# Patient Record
Sex: MALE | Race: BLACK OR AFRICAN AMERICAN | NOT HISPANIC OR LATINO
[De-identification: names, ages, dates, MRNs, and addresses within clinical notes are randomized per-mention and may not be internally consistent; named-entity substitution may affect disease eponyms.]

---

## 2018-10-15 ENCOUNTER — APPOINTMENT (OUTPATIENT)
Dept: NEUROLOGY | Facility: CLINIC | Age: 58
End: 2018-10-15

## 2018-10-29 ENCOUNTER — RECORD ABSTRACTING (OUTPATIENT)
Age: 58
End: 2018-10-29

## 2018-10-29 DIAGNOSIS — E66.3 OVERWEIGHT: ICD-10-CM

## 2018-10-29 DIAGNOSIS — Z87.39 PERSONAL HISTORY OF OTHER DISEASES OF THE MUSCULOSKELETAL SYSTEM AND CONNECTIVE TISSUE: ICD-10-CM

## 2018-10-29 DIAGNOSIS — Z86.2 PERSONAL HISTORY OF DISEASES OF THE BLOOD AND BLOOD-FORMING ORGANS AND CERTAIN DISORDERS INVOLVING THE IMMUNE MECHANISM: ICD-10-CM

## 2018-10-29 DIAGNOSIS — Z86.69 PERSONAL HISTORY OF OTHER DISEASES OF THE NERVOUS SYSTEM AND SENSE ORGANS: ICD-10-CM

## 2018-10-29 RX ORDER — ZIPRASIDONE HYDROCHLORIDE 80 MG/1
80 CAPSULE ORAL
Refills: 0 | Status: ACTIVE | COMMUNITY

## 2018-10-29 RX ORDER — DIVALPROEX SODIUM 500 1/1
500 TABLET, EXTENDED RELEASE ORAL
Refills: 0 | Status: ACTIVE | COMMUNITY

## 2018-12-03 ENCOUNTER — APPOINTMENT (OUTPATIENT)
Dept: NEUROLOGY | Facility: CLINIC | Age: 58
End: 2018-12-03

## 2018-12-27 ENCOUNTER — RX RENEWAL (OUTPATIENT)
Age: 58
End: 2018-12-27

## 2018-12-31 ENCOUNTER — APPOINTMENT (OUTPATIENT)
Dept: FAMILY MEDICINE | Facility: CLINIC | Age: 58
End: 2018-12-31
Payer: COMMERCIAL

## 2018-12-31 VITALS
HEART RATE: 63 BPM | OXYGEN SATURATION: 100 % | TEMPERATURE: 98.8 F | HEIGHT: 68 IN | SYSTOLIC BLOOD PRESSURE: 132 MMHG | DIASTOLIC BLOOD PRESSURE: 90 MMHG | BODY MASS INDEX: 28.95 KG/M2 | WEIGHT: 191 LBS

## 2018-12-31 PROCEDURE — 99214 OFFICE O/P EST MOD 30 MIN: CPT | Mod: 25

## 2018-12-31 PROCEDURE — 36415 COLL VENOUS BLD VENIPUNCTURE: CPT

## 2018-12-31 RX ORDER — ATOVAQUONE AND PROGUANIL HYDROCHLORIDE 250; 100 MG/1; MG/1
250-100 TABLET, FILM COATED ORAL
Refills: 0 | Status: DISCONTINUED | COMMUNITY
End: 2018-12-31

## 2018-12-31 NOTE — REVIEW OF SYSTEMS
[Postnasal Drip] : postnasal drip [Nasal Discharge] : nasal discharge [Cough] : cough [Negative] : Musculoskeletal [Shortness Of Breath] : no shortness of breath [Wheezing] : no wheezing

## 2018-12-31 NOTE — PHYSICAL EXAM
[No Acute Distress] : no acute distress [Well Nourished] : well nourished [Well Developed] : well developed [Well-Appearing] : well-appearing [Normal Outer Ear/Nose] : the outer ears and nose were normal in appearance [Normal Oropharynx] : the oropharynx was normal [No Respiratory Distress] : no respiratory distress  [Normal Rate] : normal rate  [Regular Rhythm] : with a regular rhythm [Normal S1, S2] : normal S1 and S2 [No Edema] : there was no peripheral edema [de-identified] : Some faint expiratory wheezes in bilateral lung bases

## 2018-12-31 NOTE — HISTORY OF PRESENT ILLNESS
[FreeTextEntry8] : Pt here for f/u.\par Was in [place] for three weeks.  Home now and has no infections.\par c/o respiratory infection.  Started a month ago with nasal congestion.  Then the cough started a week ago.  Mostly a dry cough.  Breathing is OK.  Lying in bed and coughs.  Affects sleep.  Gets some pain under both lateral ribcage when he coughs.\par No meds for cough except cough drops.  Took some tylenol yesterday because he felt feverish yesterday.  \par Still sees Dr. Garcia. Started vitamin E.  Pt has appt in a week to see Dr. Muniz (neuro) for his tremors.\par Not testing BP at home yet.\par Needs labs today including depakote level.\par Generally feels well and denies new problems.

## 2019-01-10 LAB
ALBUMIN SERPL ELPH-MCNC: 4.7 G/DL
ALP BLD-CCNC: 45 U/L
ALT SERPL-CCNC: 12 U/L
ANION GAP SERPL CALC-SCNC: 14 MMOL/L
AST SERPL-CCNC: 40 U/L
BASOPHILS # BLD AUTO: 0.02 K/UL
BASOPHILS NFR BLD AUTO: 0.3 %
BILIRUB SERPL-MCNC: 0.6 MG/DL
BUN SERPL-MCNC: 18 MG/DL
CALCIUM SERPL-MCNC: 9.4 MG/DL
CHLORIDE SERPL-SCNC: 92 MMOL/L
CHOLEST SERPL-MCNC: 132 MG/DL
CHOLEST/HDLC SERPL: 2.8 RATIO
CK SERPL-CCNC: 1425 U/L
CO2 SERPL-SCNC: 27 MMOL/L
CREAT SERPL-MCNC: 1.37 MG/DL
EOSINOPHIL # BLD AUTO: 0.07 K/UL
EOSINOPHIL NFR BLD AUTO: 1.1 %
GLUCOSE SERPL-MCNC: 95 MG/DL
HBA1C MFR BLD HPLC: 6.1 %
HCT VFR BLD CALC: 36.6 %
HDLC SERPL-MCNC: 47 MG/DL
HGB BLD-MCNC: 12 G/DL
IMM GRANULOCYTES NFR BLD AUTO: 0.2 %
LDLC SERPL CALC-MCNC: 72 MG/DL
LYMPHOCYTES # BLD AUTO: 1.52 K/UL
LYMPHOCYTES NFR BLD AUTO: 24.8 %
MAN DIFF?: NORMAL
MCHC RBC-ENTMCNC: 30.4 PG
MCHC RBC-ENTMCNC: 32.8 GM/DL
MCV RBC AUTO: 92.7 FL
MONOCYTES # BLD AUTO: 0.83 K/UL
MONOCYTES NFR BLD AUTO: 13.6 %
NEUTROPHILS # BLD AUTO: 3.67 K/UL
NEUTROPHILS NFR BLD AUTO: 60 %
PLATELET # BLD AUTO: 119 K/UL
POTASSIUM SERPL-SCNC: 4.6 MMOL/L
PROT SERPL-MCNC: 7.4 G/DL
RBC # BLD: 3.95 M/UL
RBC # FLD: 14 %
SODIUM SERPL-SCNC: 133 MMOL/L
TRIGL SERPL-MCNC: 63 MG/DL
WBC # FLD AUTO: 6.12 K/UL

## 2019-01-14 ENCOUNTER — CLINICAL ADVICE (OUTPATIENT)
Age: 59
End: 2019-01-14

## 2019-01-14 ENCOUNTER — APPOINTMENT (OUTPATIENT)
Dept: NEUROLOGY | Facility: CLINIC | Age: 59
End: 2019-01-14
Payer: COMMERCIAL

## 2019-01-14 VITALS
TEMPERATURE: 98.1 F | DIASTOLIC BLOOD PRESSURE: 85 MMHG | SYSTOLIC BLOOD PRESSURE: 136 MMHG | WEIGHT: 200 LBS | BODY MASS INDEX: 30.31 KG/M2 | HEIGHT: 68 IN | HEART RATE: 60 BPM

## 2019-01-14 PROCEDURE — 99205 OFFICE O/P NEW HI 60 MIN: CPT

## 2019-01-14 RX ORDER — CHLORHEXIDINE GLUCONATE 4 %
5 LIQUID (ML) TOPICAL
Refills: 0 | Status: DISCONTINUED | COMMUNITY
End: 2019-01-14

## 2019-01-14 RX ORDER — PREDNISONE 50 MG/1
50 TABLET ORAL DAILY
Qty: 5 | Refills: 0 | Status: DISCONTINUED | COMMUNITY
Start: 2018-12-31 | End: 2019-01-14

## 2019-01-14 NOTE — PHYSICAL EXAM
[General Appearance - Alert] : alert [General Appearance - In No Acute Distress] : in no acute distress [Oriented To Time, Place, And Person] : oriented to person, place, and time [Impaired Insight] : insight and judgment were intact [Affect] : the affect was normal [Person] : oriented to person [Place] : oriented to place [Time] : oriented to time [Short Term Intact] : short term memory intact [Concentration Intact] : normal concentrating ability [Visual Intact] : visual attention was ~T not ~L decreased [Naming Objects] : no difficulty naming common objects [Repeating Phrases] : no difficulty repeating a phrase [Writing A Sentence] : no difficulty writing a sentence [Fluency] : fluency intact [Comprehension] : comprehension intact [Reading] : reading intact [Past History] : adequate knowledge of personal past history [Cranial Nerves Optic (II)] : visual acuity intact bilaterally,  visual fields full to confrontation, pupils equal round and reactive to light [Cranial Nerves Oculomotor (III)] : extraocular motion intact [Cranial Nerves Trigeminal (V)] : facial sensation intact symmetrically [Cranial Nerves Facial (VII)] : face symmetrical [Cranial Nerves Vestibulocochlear (VIII)] : hearing was intact bilaterally [Cranial Nerves Glossopharyngeal (IX)] : tongue and palate midline [Cranial Nerves Accessory (XI - Cranial And Spinal)] : head turning and shoulder shrug symmetric [Cranial Nerves Hypoglossal (XII)] : there was no tongue deviation with protrusion [Motor Tone] : muscle tone was normal in all four extremities [Motor Strength] : muscle strength was normal in all four extremities [No Muscle Atrophy] : normal bulk in all four extremities [Sensation Tactile Decrease] : light touch was intact [Abnormal Walk] : normal gait [Balance] : balance was intact [2+] : Ankle jerk left 2+ [Past-pointing] : there was no past-pointing [Tremor] : no tremor present [Plantar Reflex Right Only] : normal on the right [Plantar Reflex Left Only] : normal on the left [FreeTextEntry6] : Right hand fine tremors accentuated during conversation and patient was able to control voluntarily.

## 2019-01-14 NOTE — CONSULT LETTER
[Dear  ___] : Dear  [unfilled], [Courtesy Letter:] : I had the pleasure of seeing your patient, [unfilled], in my office today. [Please see my note below.] : Please see my note below. [FreeTextEntry3] : Lidia Muniz MD\par

## 2019-01-14 NOTE — HISTORY OF PRESENT ILLNESS
[FreeTextEntry1] : The patient is a 59 year old male who is referred for evaluation of right hand tremors and orofacial movements. The patient notes the symptoms began about 6 months back. He noticed that he shakes his right hand subconsciously and is able to voluntarily stop the movements. He similarly has orofacial movements which he is able to control voluntarily. He denies any other associated neurological symptoms. He has Bipolar disorder and takes depakote, geodon and chlorthalidone. \par

## 2019-01-14 NOTE — REASON FOR VISIT
[Initial Evaluation] : an initial evaluation [FreeTextEntry1] : patient here today for tremors present for about 6 months to 1 year.

## 2019-01-14 NOTE — ASSESSMENT
[FreeTextEntry1] : 59 year old male with right hand abnormal movements and orofacial movements  suggestive of drug induced tardive dyskinesias.

## 2019-01-18 LAB
ALBUMIN SERPL ELPH-MCNC: 4.5 G/DL
ALP BLD-CCNC: 39 U/L
ALT SERPL-CCNC: 11 U/L
ANION GAP SERPL CALC-SCNC: 16 MMOL/L
AST SERPL-CCNC: 27 U/L
BILIRUB SERPL-MCNC: 0.6 MG/DL
BUN SERPL-MCNC: 22 MG/DL
CALCIUM SERPL-MCNC: 9.2 MG/DL
CHLORIDE SERPL-SCNC: 90 MMOL/L
CK SERPL-CCNC: 730 U/L
CO2 SERPL-SCNC: 24 MMOL/L
CREAT SERPL-MCNC: 1.48 MG/DL
GLUCOSE SERPL-MCNC: 89 MG/DL
POTASSIUM SERPL-SCNC: 5.4 MMOL/L
PROT SERPL-MCNC: 7.5 G/DL
SODIUM SERPL-SCNC: 130 MMOL/L
VALPROATE SERPL-MCNC: 96 UG/ML

## 2019-01-26 ENCOUNTER — RESULT REVIEW (OUTPATIENT)
Age: 59
End: 2019-01-26

## 2019-01-28 ENCOUNTER — APPOINTMENT (OUTPATIENT)
Dept: FAMILY MEDICINE | Facility: CLINIC | Age: 59
End: 2019-01-28

## 2019-01-28 ENCOUNTER — APPOINTMENT (OUTPATIENT)
Dept: FAMILY MEDICINE | Facility: CLINIC | Age: 59
End: 2019-01-28
Payer: COMMERCIAL

## 2019-01-28 PROCEDURE — 36415 COLL VENOUS BLD VENIPUNCTURE: CPT

## 2019-01-31 ENCOUNTER — APPOINTMENT (OUTPATIENT)
Dept: NEUROLOGY | Facility: CLINIC | Age: 59
End: 2019-01-31
Payer: COMMERCIAL

## 2019-01-31 VITALS
HEIGHT: 68 IN | HEART RATE: 69 BPM | SYSTOLIC BLOOD PRESSURE: 153 MMHG | WEIGHT: 200 LBS | TEMPERATURE: 97.7 F | DIASTOLIC BLOOD PRESSURE: 88 MMHG | BODY MASS INDEX: 30.31 KG/M2

## 2019-01-31 PROCEDURE — 99214 OFFICE O/P EST MOD 30 MIN: CPT

## 2019-01-31 NOTE — HISTORY OF PRESENT ILLNESS
[FreeTextEntry1] : The patient is a 59 year old male who is  here for follow up to discuss the MRI brain results. He is undergoing evaluation for right hand tremors and orofacial movements. Since the last visit he reports improvement of the right hand and orofocal movements, after addtion of vitamin D and discontinuation of Chlorthalidone. \par The labs were overall unremarkable except for Elevated CPK-However with in acceptable limits for  race and gender. S. Na was slightly elevated at 147.\par \par He had MRI brain which showed- \par Asymmetrically prominent extra axial space in the right cerebellomedullary angle with mild flattenning of the anterior aspect of the cerebellum suggestive of an arachnoid cyst, Otherwise essentially unremarkable MRI of brain.\par \par \par \par

## 2019-01-31 NOTE — ASSESSMENT
[FreeTextEntry1] : 59 year old male with right hand abnormal movements and orofacial movements  suggestive of drug induced tardive dyskinesias. \par \par MRI brain shows arachnoid cyst at the cerebellomedullary angle with mild flattenning of the anterior aspect of cerebellum.\par

## 2019-01-31 NOTE — PHYSICAL EXAM
[General Appearance - Alert] : alert [General Appearance - In No Acute Distress] : in no acute distress [Oriented To Time, Place, And Person] : oriented to person, place, and time [Impaired Insight] : insight and judgment were intact [Affect] : the affect was normal [Person] : oriented to person [Place] : oriented to place [Time] : oriented to time [Short Term Intact] : short term memory intact [Concentration Intact] : normal concentrating ability [Visual Intact] : visual attention was ~T not ~L decreased [Naming Objects] : no difficulty naming common objects [Repeating Phrases] : no difficulty repeating a phrase [Writing A Sentence] : no difficulty writing a sentence [Fluency] : fluency intact [Comprehension] : comprehension intact [Reading] : reading intact [Past History] : adequate knowledge of personal past history [Cranial Nerves Optic (II)] : visual acuity intact bilaterally,  visual fields full to confrontation, pupils equal round and reactive to light [Cranial Nerves Oculomotor (III)] : extraocular motion intact [Cranial Nerves Trigeminal (V)] : facial sensation intact symmetrically [Cranial Nerves Facial (VII)] : face symmetrical [Cranial Nerves Vestibulocochlear (VIII)] : hearing was intact bilaterally [Cranial Nerves Glossopharyngeal (IX)] : tongue and palate midline [Cranial Nerves Accessory (XI - Cranial And Spinal)] : head turning and shoulder shrug symmetric [Cranial Nerves Hypoglossal (XII)] : there was no tongue deviation with protrusion [Motor Tone] : muscle tone was normal in all four extremities [Motor Strength] : muscle strength was normal in all four extremities [No Muscle Atrophy] : normal bulk in all four extremities [Sensation Tactile Decrease] : light touch was intact [Abnormal Walk] : normal gait [Balance] : balance was intact [Past-pointing] : there was no past-pointing [Tremor] : no tremor present [2+] : Ankle jerk left 2+ [Plantar Reflex Right Only] : normal on the right [Plantar Reflex Left Only] : normal on the left [FreeTextEntry6] : Right hand fine tremors accentuated during conversation and patient was able to control voluntarily.

## 2019-01-31 NOTE — REASON FOR VISIT
[Procedure: _________] : a [unfilled] procedure visit [FreeTextEntry1] : Follow up for MRI results he had it done on 01/27/2019

## 2019-02-04 ENCOUNTER — TRANSCRIPTION ENCOUNTER (OUTPATIENT)
Age: 59
End: 2019-02-04

## 2019-02-04 LAB
ALBUMIN SERPL ELPH-MCNC: 4.5 G/DL
ALP BLD-CCNC: 40 U/L
ALT SERPL-CCNC: 10 U/L
ANION GAP SERPL CALC-SCNC: 14 MMOL/L
AST SERPL-CCNC: 19 U/L
BILIRUB SERPL-MCNC: 0.4 MG/DL
BUN SERPL-MCNC: 16 MG/DL
CALCIUM SERPL-MCNC: 9.7 MG/DL
CHLORIDE SERPL-SCNC: 107 MMOL/L
CK SERPL-CCNC: 290 U/L
CO2 SERPL-SCNC: 26 MMOL/L
CREAT SERPL-MCNC: 1.58 MG/DL
GLUCOSE SERPL-MCNC: 90 MG/DL
POTASSIUM SERPL-SCNC: 4.9 MMOL/L
PROT SERPL-MCNC: 7 G/DL
SODIUM SERPL-SCNC: 147 MMOL/L

## 2019-02-04 RX ORDER — CHLORTHALIDONE 25 MG/1
25 TABLET ORAL DAILY
Qty: 90 | Refills: 1 | Status: DISCONTINUED | COMMUNITY
End: 2019-02-04

## 2019-03-25 ENCOUNTER — APPOINTMENT (OUTPATIENT)
Dept: NEUROLOGY | Facility: CLINIC | Age: 59
End: 2019-03-25
Payer: COMMERCIAL

## 2019-03-25 VITALS
BODY MASS INDEX: 30.31 KG/M2 | TEMPERATURE: 99 F | HEART RATE: 68 BPM | DIASTOLIC BLOOD PRESSURE: 87 MMHG | HEIGHT: 68 IN | WEIGHT: 200 LBS | SYSTOLIC BLOOD PRESSURE: 138 MMHG

## 2019-03-25 PROCEDURE — 99214 OFFICE O/P EST MOD 30 MIN: CPT

## 2019-03-25 NOTE — HISTORY OF PRESENT ILLNESS
[FreeTextEntry1] : The patient is a 59 year old male who is  here for follow up. He is undergoing evaluation for right hand tremors and orofacial movements. Since the last visit he reports improvement of the right hand and orofocal movements, after addition of vitamin D , Vitamin E and discontinuation of Chlorthalidone. \par He had MRI brain which showed- \par Asymmetrically prominent extra axial space in the right cerebellomedullary angle with mild flattenning of the anterior aspect of the cerebellum suggestive of an arachnoid cyst, Otherwise essentially unremarkable MRI of brain.\par \par He followed up with Dr. Prescott who recommended conservative management.\par \par \par

## 2019-03-30 ENCOUNTER — TRANSCRIPTION ENCOUNTER (OUTPATIENT)
Age: 59
End: 2019-03-30

## 2019-04-18 ENCOUNTER — RX RENEWAL (OUTPATIENT)
Age: 59
End: 2019-04-18

## 2019-05-06 ENCOUNTER — APPOINTMENT (OUTPATIENT)
Dept: FAMILY MEDICINE | Facility: CLINIC | Age: 59
End: 2019-05-06
Payer: COMMERCIAL

## 2019-05-06 VITALS
HEIGHT: 68 IN | BODY MASS INDEX: 29.55 KG/M2 | TEMPERATURE: 97.9 F | WEIGHT: 195 LBS | DIASTOLIC BLOOD PRESSURE: 90 MMHG | SYSTOLIC BLOOD PRESSURE: 144 MMHG | HEART RATE: 63 BPM

## 2019-05-06 DIAGNOSIS — G93.0 CEREBRAL CYSTS: ICD-10-CM

## 2019-05-06 PROCEDURE — 99214 OFFICE O/P EST MOD 30 MIN: CPT | Mod: 25

## 2019-05-06 PROCEDURE — 36415 COLL VENOUS BLD VENIPUNCTURE: CPT

## 2019-05-06 NOTE — HISTORY OF PRESENT ILLNESS
[FreeTextEntry8] : Pt here for f/u.\par Wants hgba1c checked.  Had gone up in [place] and it went up. Tries since then to decrease sugar and carbs.  Eating green leafy vegetables.  Takes meds as directed.\par Also concerned about kidney function.  Aware of increases in creatinine.\par Saw Dr. Paresh Garcia.  Wants depakote level.  Got vitamin E 400IU.  Helping.\par Needs refill on Nadolol\par Dr. Simon, neuro, found cyst in his head. Pt saw neurosurgeon who said it was benign.  No need for surgery.\par BP repeat 166/96

## 2019-05-06 NOTE — PHYSICAL EXAM
[No Acute Distress] : no acute distress [Well Developed] : well developed [Well Nourished] : well nourished [Well-Appearing] : well-appearing [PERRL] : pupils equal round and reactive to light [Normal Sclera/Conjunctiva] : normal sclera/conjunctiva [Normal Outer Ear/Nose] : the outer ears and nose were normal in appearance [Normal Oropharynx] : the oropharynx was normal [Normal TMs] : both tympanic membranes were normal [No Respiratory Distress] : no respiratory distress  [Clear to Auscultation] : lungs were clear to auscultation bilaterally [No Accessory Muscle Use] : no accessory muscle use [Normal Rate] : normal rate  [Regular Rhythm] : with a regular rhythm [No Edema] : there was no peripheral edema [No Carotid Bruits] : no carotid bruits [Normal Gait] : normal gait

## 2019-05-13 ENCOUNTER — RX RENEWAL (OUTPATIENT)
Age: 59
End: 2019-05-13

## 2019-05-20 ENCOUNTER — APPOINTMENT (OUTPATIENT)
Dept: FAMILY MEDICINE | Facility: CLINIC | Age: 59
End: 2019-05-20
Payer: COMMERCIAL

## 2019-05-20 VITALS
DIASTOLIC BLOOD PRESSURE: 90 MMHG | BODY MASS INDEX: 29.1 KG/M2 | WEIGHT: 192 LBS | HEIGHT: 68 IN | HEART RATE: 68 BPM | OXYGEN SATURATION: 99 % | SYSTOLIC BLOOD PRESSURE: 138 MMHG

## 2019-05-20 PROCEDURE — 99214 OFFICE O/P EST MOD 30 MIN: CPT

## 2019-05-20 NOTE — PHYSICAL EXAM
[No Acute Distress] : no acute distress [Well Nourished] : well nourished [Well Developed] : well developed [Well-Appearing] : well-appearing [No Respiratory Distress] : no respiratory distress  [Clear to Auscultation] : lungs were clear to auscultation bilaterally [No Accessory Muscle Use] : no accessory muscle use [Normal Percussion] : the chest was normal to percussion [Normal Rate] : normal rate  [Regular Rhythm] : with a regular rhythm [Normal S1, S2] : normal S1 and S2 [No Edema] : there was no peripheral edema [Normal Supraclavicular Nodes] : no supraclavicular lymphadenopathy [Normal Posterior Cervical Nodes] : no posterior cervical lymphadenopathy [Normal Anterior Cervical Nodes] : no anterior cervical lymphadenopathy [No Joint Swelling] : no joint swelling

## 2019-05-20 NOTE — HISTORY OF PRESENT ILLNESS
[FreeTextEntry8] : Pt here for f/u.\meg Was in ED on May 14th.  Fainted that day.  Was standing at the island in his kitchen.  Had eaten dinner.  Was being brought more food . At that moment he fainted.  Was standing and started snoring.  Eyes rolled back.  Passed out for 15 seconds.  Didn't hit his head.  Revived immediately after.  Felt weak.  Called 911.  Paramedics took BP and sugar and were OK.\par Felt fine mentally but just physically weak.\par The ED doctor lorena labs and EKG.  \par Since leaving hospital has been OK.  Except that after dental procedure today, he felt a little lightheaded.  Hadn't eaten since this morning.  Slightly dehydrated and low sugar.\meg Passed out in [place] in 1986 when ironing.  Random event.\par No other new symptoms since the episode last week.  Consciously drinks more water.\meg has been on Lasix (too much urination), hctz (too much urinatoin), chlorthalidone (low Na)\par BP retest shows Right arm 176/100; left arm 168/100

## 2019-05-21 LAB
ALBUMIN SERPL ELPH-MCNC: 4.6 G/DL
ALP BLD-CCNC: 45 U/L
ALT SERPL-CCNC: 11 U/L
ANION GAP SERPL CALC-SCNC: 13 MMOL/L
AST SERPL-CCNC: 23 U/L
BASOPHILS # BLD AUTO: 0.03 K/UL
BASOPHILS NFR BLD AUTO: 0.9 %
BILIRUB SERPL-MCNC: 0.5 MG/DL
BUN SERPL-MCNC: 12 MG/DL
CALCIUM SERPL-MCNC: 10 MG/DL
CHLORIDE SERPL-SCNC: 103 MMOL/L
CHOLEST SERPL-MCNC: 121 MG/DL
CHOLEST/HDLC SERPL: 2.8 RATIO
CO2 SERPL-SCNC: 26 MMOL/L
CREAT SERPL-MCNC: 1.29 MG/DL
EOSINOPHIL # BLD AUTO: 0.04 K/UL
EOSINOPHIL NFR BLD AUTO: 1.3 %
ESTIMATED AVERAGE GLUCOSE: 134 MG/DL
GLUCOSE SERPL-MCNC: 97 MG/DL
HBA1C MFR BLD HPLC: 6.3 %
HCT VFR BLD CALC: 37.9 %
HDLC SERPL-MCNC: 43 MG/DL
HGB BLD-MCNC: 12.1 G/DL
IMM GRANULOCYTES NFR BLD AUTO: 0 %
LDLC SERPL CALC-MCNC: 69 MG/DL
LYMPHOCYTES # BLD AUTO: 1.35 K/UL
LYMPHOCYTES NFR BLD AUTO: 42.3 %
MAN DIFF?: NORMAL
MCHC RBC-ENTMCNC: 30.1 PG
MCHC RBC-ENTMCNC: 31.9 GM/DL
MCV RBC AUTO: 94.3 FL
MONOCYTES # BLD AUTO: 0.34 K/UL
MONOCYTES NFR BLD AUTO: 10.7 %
NEUTROPHILS # BLD AUTO: 1.43 K/UL
NEUTROPHILS NFR BLD AUTO: 44.8 %
PLATELET # BLD AUTO: 57 K/UL
POTASSIUM SERPL-SCNC: 4.6 MMOL/L
PROT SERPL-MCNC: 7.4 G/DL
RBC # BLD: 4.02 M/UL
RBC # FLD: 13.2 %
SODIUM SERPL-SCNC: 142 MMOL/L
TRIGL SERPL-MCNC: 47 MG/DL
VALPROATE SERPL-MCNC: 98 UG/ML
WBC # FLD AUTO: 3.19 K/UL

## 2019-05-23 ENCOUNTER — APPOINTMENT (OUTPATIENT)
Dept: CARDIOLOGY | Facility: CLINIC | Age: 59
End: 2019-05-23
Payer: COMMERCIAL

## 2019-05-23 VITALS
WEIGHT: 194 LBS | SYSTOLIC BLOOD PRESSURE: 180 MMHG | BODY MASS INDEX: 29.4 KG/M2 | HEART RATE: 62 BPM | DIASTOLIC BLOOD PRESSURE: 100 MMHG | HEIGHT: 68 IN

## 2019-05-23 PROCEDURE — 99244 OFF/OP CNSLTJ NEW/EST MOD 40: CPT

## 2019-05-23 RX ORDER — DIAZEPAM 5 MG/1
5 TABLET ORAL
Qty: 2 | Refills: 0 | Status: DISCONTINUED | COMMUNITY
Start: 2019-01-18 | End: 2019-05-23

## 2019-05-23 RX ORDER — CHLORHEXIDINE GLUCONATE 4 %
5 LIQUID (ML) TOPICAL
Refills: 0 | Status: DISCONTINUED | COMMUNITY
End: 2019-05-23

## 2019-05-23 RX ORDER — HYDRALAZINE HYDROCHLORIDE 25 MG/1
25 TABLET ORAL 3 TIMES DAILY
Qty: 90 | Refills: 1 | Status: DISCONTINUED | COMMUNITY
Start: 2019-05-23 | End: 2019-05-23

## 2019-05-23 NOTE — PHYSICAL EXAM
[General Appearance - Well Developed] : well developed [Normal Appearance] : normal appearance [Well Groomed] : well groomed [General Appearance - Well Nourished] : well nourished [No Deformities] : no deformities [General Appearance - In No Acute Distress] : no acute distress [Normal Conjunctiva] : the conjunctiva exhibited no abnormalities [Eyelids - No Xanthelasma] : the eyelids demonstrated no xanthelasmas [Normal Oral Mucosa] : normal oral mucosa [No Oral Pallor] : no oral pallor [No Oral Cyanosis] : no oral cyanosis [Normal Jugular Venous A Waves Present] : normal jugular venous A waves present [Normal Jugular Venous V Waves Present] : normal jugular venous V waves present [No Jugular Venous Mullins A Waves] : no jugular venous mullins A waves [Heart Rate And Rhythm] : heart rate and rhythm were normal [Heart Sounds] : normal S1 and S2 [Murmurs] : no murmurs present [Respiration, Rhythm And Depth] : normal respiratory rhythm and effort [Exaggerated Use Of Accessory Muscles For Inspiration] : no accessory muscle use [Auscultation Breath Sounds / Voice Sounds] : lungs were clear to auscultation bilaterally [Abdomen Soft] : soft [Abdomen Tenderness] : non-tender [Abdomen Mass (___ Cm)] : no abdominal mass palpated [Nail Clubbing] : no clubbing of the fingernails [Cyanosis, Localized] : no localized cyanosis [Petechial Hemorrhages (___cm)] : no petechial hemorrhages [Skin Color & Pigmentation] : normal skin color and pigmentation [] : no rash [No Venous Stasis] : no venous stasis [Skin Lesions] : no skin lesions [No Skin Ulcers] : no skin ulcer [No Xanthoma] : no  xanthoma was observed [Oriented To Time, Place, And Person] : oriented to person, place, and time [Affect] : the affect was normal [Mood] : the mood was normal [No Anxiety] : not feeling anxious

## 2019-05-23 NOTE — ASSESSMENT
[FreeTextEntry1] : 59-year-old male with hypertension and syncope. Patient is on Nadolol and had episode after eating. Patient may have had a vagal episode with post-prandial state and beta blocker as etiology\par \par HTN - Severely uncontrolled\par Would recommend hydralazine and hold spironolactone which he started only 2 days ago. \par Echocardiogram\par Treadmill stress test to evaluate peak blood pressure\par Renal ultrasound to evaluate for renal artery stenosis\par Have recommended a retrial of hydrochlorothiazide and hydralazine and/or clonidine did not work. theoretically pt may have salt retention gene and diuretic therapy should be restarted and if pressures are improved he may not have frequent urination. \par Also recommend renal consult as pt has had long standing HTN and Cr is upper limit of normal. \par \par bring in Bp diary\par \par consider outpt cardiac monitor if symptoms recur. \par

## 2019-05-23 NOTE — HISTORY OF PRESENT ILLNESS
[FreeTextEntry1] : Referred by Dr. Redding\par \par 59-year-old male with long-standing hypertension since the age of 18 who presents to cardiology for episode of syncope as well as uncontrolled hypertension. Patient states that he had his first episode of syncope in  and had a recurrent episode in . Latter episode was associated while he was waiting for food and was standing at the time. Patient denied any chest pain, shortness of breath, palpitations prior to episode. Patient states that he is prediabetic and has low card diet and does not have excessive sodium in his diet. His been intolerant to lasix and hydrochlorothiazide due to frequent urination as well as chlorthalidone due to low sodium levels. Patient is currently taking Norvasc, lisinopril, and that'll and recently was started on spironolactone. Patient has bipolar and is on psych medications and uses Nadolol for tremors as well as blood pressure and his resting heart rate is in the low 60s. EKG May 23, 2019 reveals normal sinus rhythm with nonspecific ST-T wave abnormalities, EKG May 14, 2019 reveals sinus bradycardia 57 beats per minute anterior infarct. Of note patient has significant family history of hypertension and both mom and father are . He is allergic to sulfa medications. Laboratory data reviewed : hemoglobin 12.5, hematocrit 37.9, potassium 4.2, creatinine 1.3.\par Patient denies history of myocardial infarction, stroke, cardiac arrhythmia, coronary intervention

## 2019-06-10 ENCOUNTER — APPOINTMENT (OUTPATIENT)
Dept: CARDIOLOGY | Facility: CLINIC | Age: 59
End: 2019-06-10
Payer: COMMERCIAL

## 2019-06-10 VITALS
HEART RATE: 67 BPM | OXYGEN SATURATION: 99 % | RESPIRATION RATE: 16 BRPM | HEIGHT: 68 IN | SYSTOLIC BLOOD PRESSURE: 140 MMHG | BODY MASS INDEX: 30.31 KG/M2 | WEIGHT: 200 LBS | DIASTOLIC BLOOD PRESSURE: 80 MMHG

## 2019-06-10 PROCEDURE — 99214 OFFICE O/P EST MOD 30 MIN: CPT

## 2019-06-10 NOTE — ASSESSMENT
[FreeTextEntry1] : 59-year-old male with hypertension and syncope. Patient is on Nadolol and had episode after eating. Patient may have had a vagal episode with post-prandial state and beta blocker as etiology\par \par HTN - significantly improved. \par continue hydralazine \par Echocardiogram\par Treadmill stress test to evaluate peak blood pressure\par Renal ultrasound to evaluate for renal artery stenosis eventually if unresponsive to anti-HTN meds. \par Have recommended a retrial of hydrochlorothiazide and hydralazine and/or clonidine. theoretically pt may have salt retention gene and diuretic therapy should be restarted and if pressures are improved he may not have frequent urination. Pt has only strated hydralazine and has not started HCTZ - will reserve for future use if needed. \par Also recommend renal consult as pt has had long standing HTN and Cr is upper limit of normal. \par \par bring in Bp diary. pt has yet to do this. \par

## 2019-06-10 NOTE — HISTORY OF PRESENT ILLNESS
Jacinta from [facility] calling to see if Dr. Crocker with place orders for Shar[facility] or if the Hospice physician should   Call Jacinta at [phone]   [FreeTextEntry1] : Referred by Dr. Redding\par \par 59-year-old male with long-standing hypertension since the age of 18 who presents to cardiology for episode of syncope as well as uncontrolled hypertension. Patient states that he had his first episode of syncope in  and had a recurrent episode in . Latter episode was associated while he was waiting for food and was standing at the time. Patient denied any chest pain, shortness of breath, palpitations prior to episode. Patient states that he is prediabetic and has low card diet and does not have excessive sodium in his diet. His been intolerant to lasix and hydrochlorothiazide due to frequent urination as well as chlorthalidone due to low sodium levels. Patient is currently taking Norvasc, lisinopril, and that'll and recently was started on spironolactone. Patient has bipolar and is on psych medications and uses Nadolol for tremors as well as blood pressure and his resting heart rate is in the low 60s. EKG May 23, 2019 reveals normal sinus rhythm with nonspecific ST-T wave abnormalities, EKG May 14, 2019 reveals sinus bradycardia 57 beats per minute anterior infarct. Of note patient has significant family history of hypertension and both mom and father are . He is allergic to sulfa medications. Laboratory data reviewed : hemoglobin 12.5, hematocrit 37.9, potassium 4.2, creatinine 1.3.\par Patient denies history of myocardial infarction, stroke, cardiac arrhythmia, coronary intervention\par \par Since last visit - pt continues to take hydralazine without side effects. He stopped spirinolactone.  He has yet to set up echo and stress test. He takes his BP at [facility] and reports his systolic -140 mmhg.

## 2019-06-10 NOTE — PHYSICAL EXAM
[General Appearance - Well Developed] : well developed [Well Groomed] : well groomed [General Appearance - Well Nourished] : well nourished [Normal Appearance] : normal appearance [No Deformities] : no deformities [General Appearance - In No Acute Distress] : no acute distress [Eyelids - No Xanthelasma] : the eyelids demonstrated no xanthelasmas [Normal Conjunctiva] : the conjunctiva exhibited no abnormalities [No Oral Pallor] : no oral pallor [No Oral Cyanosis] : no oral cyanosis [Normal Oral Mucosa] : normal oral mucosa [Normal Jugular Venous A Waves Present] : normal jugular venous A waves present [Normal Jugular Venous V Waves Present] : normal jugular venous V waves present [No Jugular Venous Mullins A Waves] : no jugular venous mullins A waves [Respiration, Rhythm And Depth] : normal respiratory rhythm and effort [Exaggerated Use Of Accessory Muscles For Inspiration] : no accessory muscle use [Heart Rate And Rhythm] : heart rate and rhythm were normal [Heart Sounds] : normal S1 and S2 [Auscultation Breath Sounds / Voice Sounds] : lungs were clear to auscultation bilaterally [Murmurs] : no murmurs present [Abdomen Tenderness] : non-tender [Abdomen Soft] : soft [Abdomen Mass (___ Cm)] : no abdominal mass palpated [Petechial Hemorrhages (___cm)] : no petechial hemorrhages [Nail Clubbing] : no clubbing of the fingernails [Cyanosis, Localized] : no localized cyanosis [Skin Color & Pigmentation] : normal skin color and pigmentation [No Venous Stasis] : no venous stasis [Skin Lesions] : no skin lesions [] : no rash [No Xanthoma] : no  xanthoma was observed [No Skin Ulcers] : no skin ulcer [Oriented To Time, Place, And Person] : oriented to person, place, and time [Affect] : the affect was normal [No Anxiety] : not feeling anxious [Mood] : the mood was normal

## 2019-06-17 ENCOUNTER — APPOINTMENT (OUTPATIENT)
Dept: FAMILY MEDICINE | Facility: CLINIC | Age: 59
End: 2019-06-17
Payer: COMMERCIAL

## 2019-06-17 VITALS
HEART RATE: 87 BPM | HEIGHT: 68 IN | SYSTOLIC BLOOD PRESSURE: 160 MMHG | BODY MASS INDEX: 28.34 KG/M2 | WEIGHT: 187 LBS | TEMPERATURE: 97.7 F | DIASTOLIC BLOOD PRESSURE: 98 MMHG

## 2019-06-17 PROCEDURE — 99213 OFFICE O/P EST LOW 20 MIN: CPT

## 2019-06-17 RX ORDER — SPIRONOLACTONE 25 MG/1
25 TABLET ORAL DAILY
Qty: 30 | Refills: 0 | Status: DISCONTINUED | COMMUNITY
Start: 2019-05-20 | End: 2019-06-17

## 2019-06-17 NOTE — PHYSICAL EXAM
[No Acute Distress] : no acute distress [Well Nourished] : well nourished [Well Developed] : well developed [Well-Appearing] : well-appearing [No JVD] : no jugular venous distention [No Respiratory Distress] : no respiratory distress  [Clear to Auscultation] : lungs were clear to auscultation bilaterally [No Accessory Muscle Use] : no accessory muscle use [Normal Rate] : normal rate  [Regular Rhythm] : with a regular rhythm [No Edema] : there was no peripheral edema

## 2019-06-17 NOTE — HISTORY OF PRESENT ILLNESS
[FreeTextEntry8] : Pt here for f/u.\par Has been taking hydralazine.  Took BP at [facility].  Was 140/80 at highest.  Also 130/80.  Once got 120/80.  Takes medication at 9a.  Takes measurement about 5 hrs later.  Took med at 8:30a (four hours ago).\par Hydralazine makes his urinate more than normal.  Drinks 8 cups a day of water.  Usually urinated every 2-3 hrs . Now every 1-2.  Wakes at night to urinate.  Takes 3 of the 25mg hydralazine pills a day.\par No other SE.\par Feels OK otherwise.\par When he passed out (about five weeks ago), he hit the back of his feet.  Gets some intermittent pain in the bilateral achilles area. Also the small toes.  Still mobile and gets around.  Limited problems with function.\par 140/76 in the left arm.  160/100 in the right arm.\par Feels well.  No new symptoms or problems\par

## 2019-07-15 ENCOUNTER — APPOINTMENT (OUTPATIENT)
Dept: CARDIOLOGY | Facility: CLINIC | Age: 59
End: 2019-07-15

## 2019-07-22 ENCOUNTER — NON-APPOINTMENT (OUTPATIENT)
Age: 59
End: 2019-07-22

## 2019-07-22 ENCOUNTER — RX RENEWAL (OUTPATIENT)
Age: 59
End: 2019-07-22

## 2019-07-22 ENCOUNTER — APPOINTMENT (OUTPATIENT)
Dept: CARDIOLOGY | Facility: CLINIC | Age: 59
End: 2019-07-22
Payer: COMMERCIAL

## 2019-07-22 VITALS
SYSTOLIC BLOOD PRESSURE: 150 MMHG | DIASTOLIC BLOOD PRESSURE: 90 MMHG | BODY MASS INDEX: 28.79 KG/M2 | WEIGHT: 190 LBS | HEART RATE: 64 BPM | HEIGHT: 68 IN

## 2019-07-22 VITALS — SYSTOLIC BLOOD PRESSURE: 140 MMHG | DIASTOLIC BLOOD PRESSURE: 90 MMHG

## 2019-07-22 PROCEDURE — 93000 ELECTROCARDIOGRAM COMPLETE: CPT

## 2019-07-22 PROCEDURE — 99215 OFFICE O/P EST HI 40 MIN: CPT

## 2019-07-22 NOTE — ASSESSMENT
[FreeTextEntry1] : July 22 2019 - NSR - NSTT. \par \par 59-year-old male with hypertension and syncope. Patient is on Nadolol and had episode after eating. Patient may have had a vagal episode with post-prandial state and beta blocker as etiology\par \par HTN - significantly improved. \par continue hydralazine \par Echocardiogram - July 2019 - normal ef and thickness\par Treadmill stress test to evaluate peak blood pressure - July 2019  - 203/100 mmhg. \par Renal ultrasound to evaluate for renal artery stenosis eventually if unresponsive to anti-HTN meds. \par Have recommended a retrial of hydrochlorothiazide and hydralazine and/or clonidine. theoretically pt may have salt retention gene and diuretic therapy should be restarted and if pressures are improved he may not have frequent urination. Pt has only started hydralazine and has not started HCTZ - will reserve for future use if needed. \par Also recommend renal consult as pt has had long standing HTN and Cr is upper limit of normal. \par \par Bring in BP diary. pt has yet to do this. \par \par Discussed risks and benefits of cardiac catheterization.\par Risks include but not limited to bleeding, infection, vascular compromise, stroke, heart attack, kidney failure, death.\par Benefits include revascularization and resolution of symptoms.\par Patient is in not yet in agreement with procedure. \par \par Pt would like noninvasive testing first  - will start with cardiac CT.\par \par Total time spent with patient: 45 min\par Greater than 50% of face-to-face care was devoted to counseling and/or coordination of care.\par \par Thank you for allowing us to participate in your patient’s care. Please do not hesitate to call us at [phone] if you have questions, or if you think that your patient needs to be seen urgently.\par

## 2019-07-22 NOTE — PHYSICAL EXAM
[General Appearance - Well Developed] : well developed [Normal Appearance] : normal appearance [Well Groomed] : well groomed [No Deformities] : no deformities [General Appearance - Well Nourished] : well nourished [General Appearance - In No Acute Distress] : no acute distress [Normal Conjunctiva] : the conjunctiva exhibited no abnormalities [Eyelids - No Xanthelasma] : the eyelids demonstrated no xanthelasmas [No Oral Pallor] : no oral pallor [Normal Oral Mucosa] : normal oral mucosa [No Oral Cyanosis] : no oral cyanosis [Normal Jugular Venous A Waves Present] : normal jugular venous A waves present [No Jugular Venous Mullins A Waves] : no jugular venous mullins A waves [Normal Jugular Venous V Waves Present] : normal jugular venous V waves present [Exaggerated Use Of Accessory Muscles For Inspiration] : no accessory muscle use [Respiration, Rhythm And Depth] : normal respiratory rhythm and effort [Auscultation Breath Sounds / Voice Sounds] : lungs were clear to auscultation bilaterally [Heart Rate And Rhythm] : heart rate and rhythm were normal [Heart Sounds] : normal S1 and S2 [Murmurs] : no murmurs present [Abdomen Soft] : soft [Abdomen Tenderness] : non-tender [Abdomen Mass (___ Cm)] : no abdominal mass palpated [Nail Clubbing] : no clubbing of the fingernails [Cyanosis, Localized] : no localized cyanosis [Petechial Hemorrhages (___cm)] : no petechial hemorrhages [Skin Color & Pigmentation] : normal skin color and pigmentation [No Venous Stasis] : no venous stasis [] : no rash [Skin Lesions] : no skin lesions [No Skin Ulcers] : no skin ulcer [No Xanthoma] : no  xanthoma was observed [Oriented To Time, Place, And Person] : oriented to person, place, and time [Affect] : the affect was normal [Mood] : the mood was normal [No Anxiety] : not feeling anxious [Gait - Sufficient For Exercise Testing] : the gait was sufficient for exercise testing [Abnormal Walk] : normal gait

## 2019-07-22 NOTE — HISTORY OF PRESENT ILLNESS
[FreeTextEntry1] : Referred by Dr. Redding\par \par 59-year-old male with long-standing hypertension since the age of 18 who presents to cardiology for episode of syncope as well as uncontrolled hypertension. Patient states that he had his first episode of syncope in  and had a recurrent episode in . Latter episode was associated while he was waiting for food and was standing at the time. Patient denied any chest pain, shortness of breath, palpitations prior to episode. Patient states that he is prediabetic and has low card diet and does not have excessive sodium in his diet. His been intolerant to lasix and hydrochlorothiazide due to frequent urination as well as chlorthalidone due to low sodium levels. Patient is currently taking Norvasc, lisinopril, and that'll and recently was started on spironolactone. Patient has bipolar and is on psych medications and uses Nadolol for tremors as well as blood pressure and his resting heart rate is in the low 60s. EKG May 23, 2019 reveals normal sinus rhythm with nonspecific ST-T wave abnormalities, EKG May 14, 2019 reveals sinus bradycardia 57 beats per minute anterior infarct. Of note patient has significant family history of hypertension and both mom and father are . He is allergic to sulfa medications. Laboratory data reviewed : hemoglobin 12.5, hematocrit 37.9, potassium 4.2, creatinine 1.3.\par Patient denies history of myocardial infarction, stroke, cardiac arrhythmia, coronary intervention\par \par Since last visit - pt continues to take hydralazine without side effects. Echo normal function, stress test with HTN response ( diastolic) and downsloping ekg changes.

## 2019-07-30 ENCOUNTER — APPOINTMENT (OUTPATIENT)
Dept: CARDIOLOGY | Facility: CLINIC | Age: 59
End: 2019-07-30
Payer: COMMERCIAL

## 2019-07-30 VITALS
BODY MASS INDEX: 28.79 KG/M2 | HEART RATE: 70 BPM | WEIGHT: 190 LBS | SYSTOLIC BLOOD PRESSURE: 160 MMHG | HEIGHT: 68 IN | DIASTOLIC BLOOD PRESSURE: 100 MMHG

## 2019-07-30 VITALS — DIASTOLIC BLOOD PRESSURE: 90 MMHG | SYSTOLIC BLOOD PRESSURE: 150 MMHG

## 2019-07-30 DIAGNOSIS — R94.39 ABNORMAL RESULT OF OTHER CARDIOVASCULAR FUNCTION STUDY: ICD-10-CM

## 2019-07-30 PROCEDURE — 99214 OFFICE O/P EST MOD 30 MIN: CPT

## 2019-07-30 NOTE — HISTORY OF PRESENT ILLNESS
[FreeTextEntry1] : Referred by Dr. Redding\par \par 59-year-old male with long-standing hypertension since the age of 18 who presents to cardiology for episode of syncope as well as uncontrolled hypertension. Patient states that he had his first episode of syncope in  and had a recurrent episode in . Latter episode was associated while he was waiting for food and was standing at the time. Patient denied any chest pain, shortness of breath, palpitations prior to episode. Patient states that he is prediabetic and has low card diet and does not have excessive sodium in his diet. His been intolerant to lasix and hydrochlorothiazide due to frequent urination as well as chlorthalidone due to low sodium levels. Patient is currently taking Norvasc, lisinopril, and that'll and recently was started on spironolactone. Patient has bipolar and is on psych medications and uses Nadolol for tremors as well as blood pressure and his resting heart rate is in the low 60s. EKG May 23, 2019 reveals normal sinus rhythm with nonspecific ST-T wave abnormalities, EKG May 14, 2019 reveals sinus bradycardia 57 beats per minute anterior infarct. Of note patient has significant family history of hypertension and both mom and father are . He is allergic to sulfa medications. Laboratory data reviewed : hemoglobin 12.5, hematocrit 37.9, potassium 4.2, creatinine 1.3.\par Patient denies history of myocardial infarction, stroke, cardiac arrhythmia, coronary intervention\par \par pt continues to take hydralazine without side effects. Echo normal function, stress test with HTN response ( diastolic) and downsloping ekg changes. \par \par Since last visit - Patient continues to have elevated blood pressures and is here for authorization for work papers.

## 2019-07-30 NOTE — PHYSICAL EXAM
[General Appearance - Well Developed] : well developed [Well Groomed] : well groomed [Normal Appearance] : normal appearance [General Appearance - Well Nourished] : well nourished [General Appearance - In No Acute Distress] : no acute distress [No Deformities] : no deformities [Eyelids - No Xanthelasma] : the eyelids demonstrated no xanthelasmas [Normal Conjunctiva] : the conjunctiva exhibited no abnormalities [Normal Oral Mucosa] : normal oral mucosa [No Oral Cyanosis] : no oral cyanosis [No Oral Pallor] : no oral pallor [Normal Jugular Venous A Waves Present] : normal jugular venous A waves present [Normal Jugular Venous V Waves Present] : normal jugular venous V waves present [No Jugular Venous Mullins A Waves] : no jugular venous mullins A waves [Respiration, Rhythm And Depth] : normal respiratory rhythm and effort [Exaggerated Use Of Accessory Muscles For Inspiration] : no accessory muscle use [Auscultation Breath Sounds / Voice Sounds] : lungs were clear to auscultation bilaterally [Murmurs] : no murmurs present [Heart Rate And Rhythm] : heart rate and rhythm were normal [Heart Sounds] : normal S1 and S2 [Abdomen Soft] : soft [Abdomen Tenderness] : non-tender [Abdomen Mass (___ Cm)] : no abdominal mass palpated [Gait - Sufficient For Exercise Testing] : the gait was sufficient for exercise testing [Abnormal Walk] : normal gait [Cyanosis, Localized] : no localized cyanosis [Nail Clubbing] : no clubbing of the fingernails [Petechial Hemorrhages (___cm)] : no petechial hemorrhages [] : no rash [Skin Color & Pigmentation] : normal skin color and pigmentation [No Venous Stasis] : no venous stasis [Skin Lesions] : no skin lesions [No Xanthoma] : no  xanthoma was observed [No Skin Ulcers] : no skin ulcer [Affect] : the affect was normal [Oriented To Time, Place, And Person] : oriented to person, place, and time [No Anxiety] : not feeling anxious [Mood] : the mood was normal

## 2019-08-01 ENCOUNTER — RESULT REVIEW (OUTPATIENT)
Age: 59
End: 2019-08-01

## 2019-08-15 ENCOUNTER — RX RENEWAL (OUTPATIENT)
Age: 59
End: 2019-08-15

## 2019-09-09 ENCOUNTER — RX RENEWAL (OUTPATIENT)
Age: 59
End: 2019-09-09

## 2019-09-16 ENCOUNTER — RX RENEWAL (OUTPATIENT)
Age: 59
End: 2019-09-16

## 2019-10-07 ENCOUNTER — APPOINTMENT (OUTPATIENT)
Dept: FAMILY MEDICINE | Facility: CLINIC | Age: 59
End: 2019-10-07
Payer: COMMERCIAL

## 2019-10-07 VITALS
HEIGHT: 68 IN | DIASTOLIC BLOOD PRESSURE: 102 MMHG | WEIGHT: 191 LBS | BODY MASS INDEX: 28.95 KG/M2 | HEART RATE: 88 BPM | SYSTOLIC BLOOD PRESSURE: 178 MMHG

## 2019-10-07 DIAGNOSIS — G24.01 DRUG INDUCED SUBACUTE DYSKINESIA: ICD-10-CM

## 2019-10-07 DIAGNOSIS — Z23 ENCOUNTER FOR IMMUNIZATION: ICD-10-CM

## 2019-10-07 PROCEDURE — 99215 OFFICE O/P EST HI 40 MIN: CPT | Mod: 25

## 2019-10-07 PROCEDURE — G0008: CPT

## 2019-10-07 PROCEDURE — 90686 IIV4 VACC NO PRSV 0.5 ML IM: CPT

## 2019-10-07 PROCEDURE — 36415 COLL VENOUS BLD VENIPUNCTURE: CPT

## 2019-10-07 NOTE — PHYSICAL EXAM
[Normal] : normal rate, regular rhythm, normal S1 and S2 and no murmur heard [Normal Mood] : the mood was normal [Normal Insight/Judgement] : insight and judgment were intact [de-identified] : Tremor of hands [de-identified] : Flat affect

## 2019-10-07 NOTE — HISTORY OF PRESENT ILLNESS
[FreeTextEntry1] : F/u on meds [de-identified] : Pt here for eval.\par Working night shift.  Worked last night.  Hasn't slept and it's 2:30p now.  Says that's why his BP is elevated.\par Has been taking BP at home.  Brings in notebook with results.  In the last two months, tends to be 100-110s/60-70s.  He brings in his new blood pressure machine.\par Sees neuro for tremor.  Has appt next year.\par Feels well.  No SE or problems.  \par Sees psych.  Requests blood recheck of valproic acid level.\par

## 2019-10-08 LAB
ALBUMIN SERPL ELPH-MCNC: 4.9 G/DL
ALP BLD-CCNC: 37 U/L
ALT SERPL-CCNC: 17 U/L
ANION GAP SERPL CALC-SCNC: 18 MMOL/L
AST SERPL-CCNC: 28 U/L
BASOPHILS # BLD AUTO: 0.04 K/UL
BASOPHILS NFR BLD AUTO: 0.6 %
BILIRUB SERPL-MCNC: 0.7 MG/DL
BUN SERPL-MCNC: 13 MG/DL
CALCIUM SERPL-MCNC: 10.5 MG/DL
CHLORIDE SERPL-SCNC: 96 MMOL/L
CHOLEST SERPL-MCNC: 131 MG/DL
CHOLEST/HDLC SERPL: 2.8 RATIO
CO2 SERPL-SCNC: 22 MMOL/L
CREAT SERPL-MCNC: 1.5 MG/DL
EOSINOPHIL # BLD AUTO: 0.03 K/UL
EOSINOPHIL NFR BLD AUTO: 0.5 %
ESTIMATED AVERAGE GLUCOSE: 128 MG/DL
GLUCOSE SERPL-MCNC: 88 MG/DL
HBA1C MFR BLD HPLC: 6.1 %
HCT VFR BLD CALC: 38.4 %
HDLC SERPL-MCNC: 47 MG/DL
HGB BLD-MCNC: 13 G/DL
IMM GRANULOCYTES NFR BLD AUTO: 0.6 %
LDLC SERPL CALC-MCNC: 74 MG/DL
LYMPHOCYTES # BLD AUTO: 1.78 K/UL
LYMPHOCYTES NFR BLD AUTO: 28.8 %
MAN DIFF?: NORMAL
MCHC RBC-ENTMCNC: 31.4 PG
MCHC RBC-ENTMCNC: 33.9 GM/DL
MCV RBC AUTO: 92.8 FL
MONOCYTES # BLD AUTO: 0.81 K/UL
MONOCYTES NFR BLD AUTO: 13.1 %
NEUTROPHILS # BLD AUTO: 3.47 K/UL
NEUTROPHILS NFR BLD AUTO: 56.4 %
PLATELET # BLD AUTO: 138 K/UL
POTASSIUM SERPL-SCNC: 4.1 MMOL/L
PROT SERPL-MCNC: 7.3 G/DL
RBC # BLD: 4.14 M/UL
RBC # FLD: 12.7 %
SODIUM SERPL-SCNC: 136 MMOL/L
TRIGL SERPL-MCNC: 48 MG/DL
TSH SERPL-ACNC: 3.47 UIU/ML
VALPROATE SERPL-MCNC: 68 UG/ML
WBC # FLD AUTO: 6.17 K/UL

## 2019-10-30 ENCOUNTER — RX RENEWAL (OUTPATIENT)
Age: 59
End: 2019-10-30

## 2019-11-08 ENCOUNTER — APPOINTMENT (OUTPATIENT)
Dept: NEPHROLOGY | Facility: CLINIC | Age: 59
End: 2019-11-08
Payer: COMMERCIAL

## 2019-11-08 VITALS
BODY MASS INDEX: 27.89 KG/M2 | DIASTOLIC BLOOD PRESSURE: 98 MMHG | WEIGHT: 184 LBS | HEIGHT: 68 IN | HEART RATE: 80 BPM | SYSTOLIC BLOOD PRESSURE: 170 MMHG

## 2019-11-08 PROCEDURE — 36415 COLL VENOUS BLD VENIPUNCTURE: CPT

## 2019-11-08 PROCEDURE — 99204 OFFICE O/P NEW MOD 45 MIN: CPT | Mod: 25

## 2019-11-08 NOTE — PHYSICAL EXAM
[General Appearance - Alert] : alert [General Appearance - In No Acute Distress] : in no acute distress [Extraocular Movements] : extraocular movements were intact [Sclera] : the sclera and conjunctiva were normal [Outer Ear] : the ears and nose were normal in appearance [Hearing Threshold Finger Rub Not [place]] : hearing was normal [Exaggerated Use Of Accessory Muscles For Inspiration] : no accessory muscle use [Apical Impulse] : the apical impulse was normal [] : no respiratory distress [Bowel Sounds] : normal bowel sounds [Abdomen Soft] : soft [Heart Rate And Rhythm] : heart rate was normal and rhythm regular [Impaired Insight] : insight and judgment were intact [Oriented To Time, Place, And Person] : oriented to person, place, and time [Mood] : the mood was normal [Affect] : the affect was normal

## 2019-11-08 NOTE — ASSESSMENT
[FreeTextEntry1] : HTN since age 18, given that he is on 5 anti HTN strongly suspect secondary cause of HTN, ie MARILUZ, yperaldo, coarctation, etc - has severe lack of sleep  ( works as ), suspect MARILUZ, also states that he has BP readings as low as 90's systolic and has passed out ( at home) - will check renin/venancio, serum metanephrine, refer to sleep study, thyroid panel, instruct him to bring BP monitor and repeat labs today and again in a week - advise he reduce hctz to 12.5mg and lisinopril to 20mg -

## 2019-11-08 NOTE — HISTORY OF PRESENT ILLNESS
[FreeTextEntry1] : 60 yo male from [facility] with hx of bipolar d/o never on Lithium, HTN ( on 5 meds), hx of syncope ( was seen by cardiology) - creatinine fluctuating from 1.29 to 1.68 over past year - labs also indicate both low and high SNa, raising possibility that his fluid intake varies, furthermore, fasts for his blood tests andmay fast for as long as 14 hours - denies NSAID, alsohas issues with sleeping both insomnia =/- hilda which may explain his HTN;

## 2019-11-12 LAB
ALDOSTERONE SERUM: 3.3 NG/DL
ANION GAP SERPL CALC-SCNC: 13 MMOL/L
APPEARANCE: CLEAR
BILIRUBIN URINE: NEGATIVE
BLOOD URINE: NEGATIVE
BUN SERPL-MCNC: 18 MG/DL
CALCIUM SERPL-MCNC: 10 MG/DL
CHLORIDE SERPL-SCNC: 99 MMOL/L
CO2 SERPL-SCNC: 28 MMOL/L
COLOR: NORMAL
CREAT SERPL-MCNC: 1.49 MG/DL
CREAT SPEC-SCNC: 78 MG/DL
CREAT/PROT UR: 0.1 RATIO
GLUCOSE QUALITATIVE U: NEGATIVE
GLUCOSE SERPL-MCNC: 99 MG/DL
KETONES URINE: NEGATIVE
LEUKOCYTE ESTERASE URINE: NEGATIVE
NITRITE URINE: NEGATIVE
OSMOLALITY SERPL: 292 MOSMOL/KG
PH URINE: 7
POTASSIUM SERPL-SCNC: 4.6 MMOL/L
PROT UR-MCNC: 4 MG/DL
PROTEIN URINE: NEGATIVE
SODIUM SERPL-SCNC: 140 MMOL/L
SPECIFIC GRAVITY URINE: 1.01
URATE SERPL-MCNC: 9 MG/DL
UROBILINOGEN URINE: NORMAL

## 2019-11-13 ENCOUNTER — APPOINTMENT (OUTPATIENT)
Dept: NEPHROLOGY | Facility: CLINIC | Age: 59
End: 2019-11-13
Payer: COMMERCIAL

## 2019-11-13 ENCOUNTER — APPOINTMENT (OUTPATIENT)
Dept: PULMONOLOGY | Facility: CLINIC | Age: 59
End: 2019-11-13
Payer: COMMERCIAL

## 2019-11-13 ENCOUNTER — RESULT REVIEW (OUTPATIENT)
Age: 59
End: 2019-11-13

## 2019-11-13 VITALS
HEIGHT: 68 IN | SYSTOLIC BLOOD PRESSURE: 144 MMHG | BODY MASS INDEX: 28.95 KG/M2 | WEIGHT: 191 LBS | OXYGEN SATURATION: 100 % | HEART RATE: 77 BPM | DIASTOLIC BLOOD PRESSURE: 92 MMHG

## 2019-11-13 VITALS — SYSTOLIC BLOOD PRESSURE: 144 MMHG | HEART RATE: 58 BPM | DIASTOLIC BLOOD PRESSURE: 90 MMHG

## 2019-11-13 VITALS — DIASTOLIC BLOOD PRESSURE: 88 MMHG | SYSTOLIC BLOOD PRESSURE: 136 MMHG

## 2019-11-13 PROCEDURE — 93784 AMBL BP MNTR W/SOFTWARE: CPT

## 2019-11-13 PROCEDURE — 36415 COLL VENOUS BLD VENIPUNCTURE: CPT

## 2019-11-13 PROCEDURE — 99204 OFFICE O/P NEW MOD 45 MIN: CPT

## 2019-11-13 NOTE — REVIEW OF SYSTEMS
[Dyspnea] : dyspnea [Hypertension] : ~T hypertension [As Noted in HPI] : as noted in HPI [Negative] : Pulmonary Hypertension [Involuntary Movements] : ~T involuntary movements [Chills] : no chills [Fever] : no fever [Recent Wt Gain (___ Lbs)] : no recent weight gain [Recent Wt Loss (___ Lbs)] : no recent weight loss [Reflux] : no reflux [Heartburn] : no heartburn

## 2019-11-13 NOTE — HISTORY OF PRESENT ILLNESS
[FreeTextEntry1] : 59 year old male with HTN referred for evaluation of sleep disorder breathing.\par C/o insomnia for many years.\par Snores at night. No observed pauses. No gasps or choking during sleep.\par No morning HA.\par Sometimes feels tired daytime.\par Naps once per week.\par \par Works in shifts currently 2 pm to 10 pm\par BT 2 am. WT 10 am\par SLT 1 hr.\par Wakes 1-2 times. Falls back asleep after 15 min.\par Denies creepy crawling sensation in the evening\par \par Has noticed dyspnea on exertion for 6 months. No wheezing. No cough\par \par SHX: never smoker\par FHX: no lung problems\par

## 2019-11-13 NOTE — PHYSICAL EXAM
[General Appearance - Well Developed] : well developed [Normal Appearance] : normal appearance [General Appearance - Well Nourished] : well nourished [No Deformities] : no deformities [General Appearance - In No Acute Distress] : no acute distress [Normal Conjunctiva] : the conjunctiva exhibited no abnormalities [Neck Appearance] : the appearance of the neck was normal [III] : III [Thyroid Diffuse Enlargement] : the thyroid was not enlarged [Jugular Venous Distention Increased] : there was no jugular-venous distention [Heart Sounds] : normal S1 and S2 [Heart Rate And Rhythm] : heart rate and rhythm were normal [Murmurs] : no murmurs present [Edema] : no peripheral edema present [Arterial Pulses Normal] : the arterial pulses were normal [Auscultation Breath Sounds / Voice Sounds] : lungs were clear to auscultation bilaterally [Exaggerated Use Of Accessory Muscles For Inspiration] : no accessory muscle use [Respiration, Rhythm And Depth] : normal respiratory rhythm and effort [Bowel Sounds] : normal bowel sounds [Abdomen Soft] : soft [Abdomen Tenderness] : non-tender [Nail Clubbing] : no clubbing of the fingernails [Abnormal Walk] : normal gait [] : no hepato-splenomegaly [Cyanosis, Localized] : no localized cyanosis [No Focal Deficits] : no focal deficits [Skin Color & Pigmentation] : normal skin color and pigmentation [Oriented To Time, Place, And Person] : oriented to person, place, and time [Impaired Insight] : insight and judgment were intact [Affect] : the affect was normal

## 2019-11-15 LAB
METANEPHRINE, PL: 114 PG/ML
NORMETANEPHRINE, PL: 156 PG/ML

## 2019-12-04 LAB — METHAPYRILENE [MASS/VOLUME] IN SERUM OR PLASMA: NORMAL

## 2019-12-17 ENCOUNTER — RX RENEWAL (OUTPATIENT)
Age: 59
End: 2019-12-17

## 2019-12-20 ENCOUNTER — APPOINTMENT (OUTPATIENT)
Dept: NEPHROLOGY | Facility: CLINIC | Age: 59
End: 2019-12-20
Payer: COMMERCIAL

## 2019-12-20 VITALS
WEIGHT: 185 LBS | HEIGHT: 68 IN | BODY MASS INDEX: 28.04 KG/M2 | SYSTOLIC BLOOD PRESSURE: 132 MMHG | HEART RATE: 60 BPM | DIASTOLIC BLOOD PRESSURE: 82 MMHG

## 2019-12-20 PROCEDURE — 99214 OFFICE O/P EST MOD 30 MIN: CPT

## 2019-12-20 RX ORDER — HYDROCHLOROTHIAZIDE 25 MG/1
25 TABLET ORAL DAILY
Qty: 90 | Refills: 1 | Status: DISCONTINUED | COMMUNITY
Start: 2019-07-30 | End: 2019-12-20

## 2019-12-20 NOTE — ASSESSMENT
[FreeTextEntry1] : HTN since age 18, w/u -ve thus far for 2/2 cause of HTN;being w/u for MARILUZ - BP meds adjusted - feels well\par \par 58 yo male from [facility] - here for f/u of TASHA vs CKD, with adjustments in BP meds and initiation of allopurinol creatinine has improved to 1.1 from high of 1.5; was in ER for L leg pain - likely 2/2 gout after startiing allopurinol; BP well controlled at this time - will d/c HCTZ and reduce hydralazine to 25mg bid\par \par

## 2019-12-20 NOTE — PHYSICAL EXAM
[General Appearance - In No Acute Distress] : in no acute distress [General Appearance - Alert] : alert [Sclera] : the sclera and conjunctiva were normal [Extraocular Movements] : extraocular movements were intact [Outer Ear] : the ears and nose were normal in appearance [Hearing Threshold Finger Rub Not [place]] : hearing was normal [] : no respiratory distress [Exaggerated Use Of Accessory Muscles For Inspiration] : no accessory muscle use [Apical Impulse] : the apical impulse was normal [Heart Rate And Rhythm] : heart rate was normal and rhythm regular [Abdomen Soft] : soft [Bowel Sounds] : normal bowel sounds [Oriented To Time, Place, And Person] : oriented to person, place, and time [Impaired Insight] : insight and judgment were intact [Affect] : the affect was normal [Mood] : the mood was normal

## 2019-12-20 NOTE — HISTORY OF PRESENT ILLNESS
[FreeTextEntry1] : 58 yo male from [facility] - here for f/u of TASHA vs CKD, with adjustments in BP meds and initiation of allopurinol creatinine has improved to 1.1 from high of 1.5; was in ER for L leg pain - likely 2/2 gout after startiing allopurinol; BP well controlled at this time - will d/c HCTZ and reduce hydralazine to 25mg bid\par \par from prior visit; [place] male  with hx of bipolar d/o never on Lithium, HTN ( on 5 meds), hx of syncope ( was seen by cardiology) - creatinine fluctuating from 1.29 to 1.68 over past year - labs also indicate both low and high SNa, raising possibility that his fluid intake varies, furthermore, fasts for his blood tests andmay fast for as long as 14 hours - denies NSAID, alsohas issues with sleeping both insomnia =/- hilda which may explain his HTN;

## 2020-01-07 ENCOUNTER — APPOINTMENT (OUTPATIENT)
Dept: PULMONOLOGY | Facility: CLINIC | Age: 60
End: 2020-01-07
Payer: COMMERCIAL

## 2020-01-07 VITALS
HEIGHT: 68 IN | OXYGEN SATURATION: 97 % | HEART RATE: 60 BPM | SYSTOLIC BLOOD PRESSURE: 148 MMHG | WEIGHT: 185 LBS | DIASTOLIC BLOOD PRESSURE: 90 MMHG | BODY MASS INDEX: 28.04 KG/M2

## 2020-01-07 PROCEDURE — 99214 OFFICE O/P EST MOD 30 MIN: CPT

## 2020-01-07 NOTE — REVIEW OF SYSTEMS
[Nocturia] : nocturia [Shortness Of Breath] : shortness of breath [Difficulty Maintaining Sleep] : difficulty maintaining sleep [Difficulty Initiating Sleep] : difficulty falling asleep [Negative] : Psychiatric [Lower Extremity Discomfort] : no lower extremity discomfort [Unusual Sleep Behavior] : no unusual sleep behavior [Late day/ Evening symptoms] : no late day/evening symptoms [Hypersomnolence] : not sleeping much more than usual [Cataplexy] :  no cataplexy [Hypnogogic Hallucinations] : no hypnogogic hallucinations [Sleep Paralysis] : no sleep paralysis [Hypnopompic Hallucinations] : no hypnopompic hallucinations [FreeTextEntry9] : htn

## 2020-01-07 NOTE — HISTORY OF PRESENT ILLNESS
[FreeTextEntry1] : 59 year old male with MARILUZ and insomnia for f/u.\par Feels the same. No new complaints\par Has insomnia.\par Tried Ambien in the past with good results\par Same occasional dyspnea on exertion

## 2020-01-07 NOTE — PHYSICAL EXAM
[General Appearance - Well Nourished] : well nourished [Normal Appearance] : normal appearance [General Appearance - Well Developed] : well developed [Normal Conjunctiva] : the conjunctiva exhibited no abnormalities [III] : III [No Deformities] : no deformities [Neck Appearance] : the appearance of the neck was normal [Jugular Venous Distention Increased] : there was no jugular-venous distention [Neck Cervical Mass (___cm)] : no neck mass was observed [Thyroid Diffuse Enlargement] : the thyroid was not enlarged [Heart Sounds] : normal S1 and S2 [Heart Rate And Rhythm] : heart rate was normal and rhythm regular [Heart Sounds Gallop] : no gallops [Apical Impulse] : the apical impulse was normal [Respiration, Rhythm And Depth] : normal respiratory rhythm and effort [Exaggerated Use Of Accessory Muscles For Inspiration] : no accessory muscle use [Auscultation Breath Sounds / Voice Sounds] : lungs were clear to auscultation bilaterally [Abdomen Soft] : soft [Bowel Sounds] : normal bowel sounds [Abdomen Tenderness] : non-tender [] : no hepato-splenomegaly [Abnormal Walk] : normal gait [Nail Clubbing] : no clubbing  or cyanosis of the fingernails [Skin Color & Pigmentation] : normal skin color and pigmentation [Cyanosis, Localized] : no localized cyanosis [No Focal Deficits] : no focal deficits [Impaired Insight] : insight and judgment were intact [Oriented To Time, Place, And Person] : oriented to person, place, and time [Affect] : the affect was normal

## 2020-01-08 LAB
ANION GAP SERPL CALC-SCNC: 13 MMOL/L
BUN SERPL-MCNC: 14 MG/DL
CALCIUM SERPL-MCNC: 9.3 MG/DL
CHLORIDE SERPL-SCNC: 104 MMOL/L
CO2 SERPL-SCNC: 26 MMOL/L
CREAT SERPL-MCNC: 1.33 MG/DL
GLUCOSE SERPL-MCNC: 89 MG/DL
POTASSIUM SERPL-SCNC: 4.1 MMOL/L
SODIUM SERPL-SCNC: 143 MMOL/L
URATE SERPL-MCNC: 6.7 MG/DL

## 2020-01-30 ENCOUNTER — APPOINTMENT (OUTPATIENT)
Dept: FAMILY MEDICINE | Facility: CLINIC | Age: 60
End: 2020-01-30
Payer: COMMERCIAL

## 2020-01-30 VITALS
SYSTOLIC BLOOD PRESSURE: 140 MMHG | WEIGHT: 183 LBS | OXYGEN SATURATION: 99 % | TEMPERATURE: 100 F | HEIGHT: 66 IN | HEART RATE: 72 BPM | BODY MASS INDEX: 29.41 KG/M2 | DIASTOLIC BLOOD PRESSURE: 80 MMHG

## 2020-01-30 PROCEDURE — 99214 OFFICE O/P EST MOD 30 MIN: CPT

## 2020-01-30 NOTE — HISTORY OF PRESENT ILLNESS
[FreeTextEntry8] : 59 yo male with history of HTN presents for URI symptoms \par began 2 weeks \par cough, congestion hoarseness\par cough- previously productive no longer productive \par feverish at home no temp measurement at home \par no nausea vomiting or diarrhea\par sick contact at work received Z pack \par \par foot pain noted since yesterday without edema \par when first gets out of bed\par no pain persistent \par feels better at the moment\par history of gout \par on allopurinol

## 2020-01-30 NOTE — PHYSICAL EXAM
[Normal] : normal rate, regular rhythm, normal S1 and S2 and no murmur heard [de-identified] : nasal congestion b/l [de-identified] : right foot with FROM same on left with no signifcant edema or skin changes; tender mildly to palpation dorsallaly ' posterior tibial and dorsalis pedis 2+ b/l

## 2020-02-03 ENCOUNTER — RX RENEWAL (OUTPATIENT)
Age: 60
End: 2020-02-03

## 2020-02-10 ENCOUNTER — APPOINTMENT (OUTPATIENT)
Dept: FAMILY MEDICINE | Facility: CLINIC | Age: 60
End: 2020-02-10
Payer: COMMERCIAL

## 2020-02-10 VITALS
BODY MASS INDEX: 28.64 KG/M2 | DIASTOLIC BLOOD PRESSURE: 82 MMHG | HEART RATE: 80 BPM | HEIGHT: 68 IN | SYSTOLIC BLOOD PRESSURE: 120 MMHG | WEIGHT: 189 LBS

## 2020-02-10 PROCEDURE — 99214 OFFICE O/P EST MOD 30 MIN: CPT | Mod: 25

## 2020-02-10 PROCEDURE — 36415 COLL VENOUS BLD VENIPUNCTURE: CPT

## 2020-02-10 NOTE — PHYSICAL EXAM
[Normal] : affect was normal and insight and judgment were intact [de-identified] : Pain to palp of the maxillary sinus area on the right of face and next to nose, mild post nasal drip, slightly nasal voice

## 2020-02-10 NOTE — REVIEW OF SYSTEMS
[Fever] : fever [Chills] : chills [Earache] : earache [Postnasal Drip] : postnasal drip [Negative] : Genitourinary

## 2020-02-10 NOTE — HISTORY OF PRESENT ILLNESS
[FreeTextEntry1] : Blood pressure check, med refill, possible sinusitis [de-identified] : Pt c/o symptoms that he thinks may be sinusitis\par Right eye was aching a week ago.  Right cheek.  And right ear.  Went to eye doctor about five days ago.  Eyes were good.  But he thought the patient was having sinus infection.\par c/o ongoing pain on the right side of face in the sinus area as well as the right nostril and the right eye.  No problems with vision.  No rashes on skin.\par Some fevers/chills.\par Feels nasal congestion on the right.  Hard to pass air.\par Had used nasal spray given by doctor.  Helped a little but problem persists.\par Patient has seen nephrology.  Adjusted meds.\par Saw pulm for sleep apnea.  Had sleep studies.  Started on CPAP machine.  Getting used to it.\par Left ankle surgery in November.  Had u/s, xray, CT scan.  No PE or DVT.\par

## 2020-02-13 LAB
ALBUMIN SERPL ELPH-MCNC: 3.7 G/DL
ALP BLD-CCNC: 54 U/L
ALT SERPL-CCNC: 11 U/L
ANION GAP SERPL CALC-SCNC: 14 MMOL/L
AST SERPL-CCNC: 21 U/L
BILIRUB SERPL-MCNC: 0.3 MG/DL
BUN SERPL-MCNC: 12 MG/DL
CALCIUM SERPL-MCNC: 9 MG/DL
CHLORIDE SERPL-SCNC: 99 MMOL/L
CHOLEST SERPL-MCNC: 102 MG/DL
CHOLEST/HDLC SERPL: 2.6 RATIO
CK SERPL-CCNC: 426 U/L
CO2 SERPL-SCNC: 24 MMOL/L
CREAT SERPL-MCNC: 1.22 MG/DL
ESTIMATED AVERAGE GLUCOSE: 137 MG/DL
GLUCOSE SERPL-MCNC: 92 MG/DL
HBA1C MFR BLD HPLC: 6.4 %
HDLC SERPL-MCNC: 39 MG/DL
LDLC SERPL CALC-MCNC: 50 MG/DL
POTASSIUM SERPL-SCNC: 4.7 MMOL/L
PROT SERPL-MCNC: 7 G/DL
SODIUM SERPL-SCNC: 136 MMOL/L
TRIGL SERPL-MCNC: 64 MG/DL
URATE SERPL-MCNC: 6.8 MG/DL
VALPROATE SERPL-MCNC: 66 UG/ML

## 2020-05-11 ENCOUNTER — APPOINTMENT (OUTPATIENT)
Dept: NEUROLOGY | Facility: CLINIC | Age: 60
End: 2020-05-11

## 2020-05-12 ENCOUNTER — APPOINTMENT (OUTPATIENT)
Dept: PULMONOLOGY | Facility: CLINIC | Age: 60
End: 2020-05-12

## 2020-05-14 ENCOUNTER — APPOINTMENT (OUTPATIENT)
Dept: NEPHROLOGY | Facility: CLINIC | Age: 60
End: 2020-05-14
Payer: COMMERCIAL

## 2020-05-14 ENCOUNTER — RESULT REVIEW (OUTPATIENT)
Age: 60
End: 2020-05-14

## 2020-05-14 VITALS
WEIGHT: 186 LBS | BODY MASS INDEX: 28.19 KG/M2 | TEMPERATURE: 99 F | DIASTOLIC BLOOD PRESSURE: 92 MMHG | SYSTOLIC BLOOD PRESSURE: 140 MMHG | HEIGHT: 68 IN | HEART RATE: 80 BPM

## 2020-05-14 PROCEDURE — 36415 COLL VENOUS BLD VENIPUNCTURE: CPT

## 2020-05-14 PROCEDURE — 99214 OFFICE O/P EST MOD 30 MIN: CPT | Mod: 25

## 2020-05-14 RX ORDER — DOXYCYCLINE HYCLATE 100 MG/1
100 CAPSULE ORAL TWICE DAILY
Qty: 20 | Refills: 0 | Status: DISCONTINUED | COMMUNITY
Start: 2020-02-10 | End: 2020-05-14

## 2020-05-14 RX ORDER — ZOLPIDEM TARTRATE 5 MG/1
5 TABLET ORAL
Qty: 10 | Refills: 0 | Status: DISCONTINUED | COMMUNITY
Start: 2020-01-07 | End: 2020-05-14

## 2020-05-14 NOTE — HISTORY OF PRESENT ILLNESS
[FreeTextEntry1] : 58 yo male from [facility] - here for f/u of TASHA onCKD, with adjustments in BP meds and initiation of allopurinol creatinine has improved to 1.1/1.2 from high of 1.5;\par \par Continues to struggle with insomnia, though has MARILUZ and is using CPAP\par Diastolic BP elevated - slat intake high\par \par No recent episode of gout - on allopurinol for it - d/c'd HCTZ and reduce hydralazine to 25mg bid\par \par from prior visit; [place] male  with hx of bipolar d/o never on Lithium, HTN ( on 5 meds), hx of syncope ( was seen by cardiology) - creatinine fluctuating from 1.29 to 1.68 over past year - labs also indicate both low and high SNa, raising possibility that his fluid intake varies, furthermore, fasts for his blood tests and may fast for as long as 14 hours\par  \par SHx: denies NSAIDNo smoking no alcohol\par FHx:no FHx of CKD

## 2020-05-14 NOTE — ASSESSMENT
[FreeTextEntry1] : HTN since age 18, SBP improved, DBP elevated,no hyepraldo, found to have MARILUZ - using CPAP, but cont to have diff sleeping - counseled on low salt diet as this may explain elevated DBP\par \par CKD 3 - recheck labs,  on fluid intake, compliance with meds - may need increase in hydralazine\par \par Hyperuricemia - cont allopurino, recheck urate levell\par \par MARILUZ - f/u with Pulm\par \par \par

## 2020-06-14 ENCOUNTER — RESULT REVIEW (OUTPATIENT)
Age: 60
End: 2020-06-14

## 2020-06-18 ENCOUNTER — TRANSCRIPTION ENCOUNTER (OUTPATIENT)
Age: 60
End: 2020-06-18

## 2020-06-19 ENCOUNTER — TRANSCRIPTION ENCOUNTER (OUTPATIENT)
Age: 60
End: 2020-06-19

## 2020-06-23 ENCOUNTER — APPOINTMENT (OUTPATIENT)
Dept: FAMILY MEDICINE | Facility: CLINIC | Age: 60
End: 2020-06-23
Payer: COMMERCIAL

## 2020-06-23 ENCOUNTER — TRANSCRIPTION ENCOUNTER (OUTPATIENT)
Age: 60
End: 2020-06-23

## 2020-06-23 VITALS — DIASTOLIC BLOOD PRESSURE: 80 MMHG | SYSTOLIC BLOOD PRESSURE: 126 MMHG

## 2020-06-23 VITALS
WEIGHT: 178 LBS | HEART RATE: 71 BPM | SYSTOLIC BLOOD PRESSURE: 140 MMHG | HEIGHT: 68 IN | DIASTOLIC BLOOD PRESSURE: 90 MMHG | OXYGEN SATURATION: 100 % | TEMPERATURE: 98.5 F | BODY MASS INDEX: 26.98 KG/M2

## 2020-06-23 DIAGNOSIS — J06.9 ACUTE UPPER RESPIRATORY INFECTION, UNSPECIFIED: ICD-10-CM

## 2020-06-23 DIAGNOSIS — Z86.711 PERSONAL HISTORY OF PULMONARY EMBOLISM: ICD-10-CM

## 2020-06-23 DIAGNOSIS — M79.671 PAIN IN RIGHT FOOT: ICD-10-CM

## 2020-06-23 DIAGNOSIS — T50.905A OTHER SECONDARY THROMBOCYTOPENIA: ICD-10-CM

## 2020-06-23 DIAGNOSIS — D69.59 OTHER SECONDARY THROMBOCYTOPENIA: ICD-10-CM

## 2020-06-23 DIAGNOSIS — Z87.898 PERSONAL HISTORY OF OTHER SPECIFIED CONDITIONS: ICD-10-CM

## 2020-06-23 DIAGNOSIS — R06.00 DYSPNEA, UNSPECIFIED: ICD-10-CM

## 2020-06-23 DIAGNOSIS — J01.00 ACUTE MAXILLARY SINUSITIS, UNSPECIFIED: ICD-10-CM

## 2020-06-23 PROCEDURE — 99213 OFFICE O/P EST LOW 20 MIN: CPT

## 2020-06-23 NOTE — HISTORY OF PRESENT ILLNESS
[Post-hospitalization from ___ Hospital] : Post-hospitalization from [unfilled] Hospital [Admitted on: ___] : The patient was admitted on [unfilled] [Discharged on ___] : discharged on [unfilled] [Other: ____] : [unfilled] [FreeTextEntry2] : Admitted with COVID-19 pneumonia. Found to have pulmonary emboli. Feeling well currently except for some residual fatigue/weakness. .

## 2020-06-23 NOTE — HEALTH RISK ASSESSMENT
[No] : In the past 12 months have you used drugs other than those required for medical reasons? No [No falls in past year] : Patient reported no falls in the past year [0] : 2) Feeling down, depressed, or hopeless: Not at all (0) [Patient declined colonoscopy] : Patient declined colonoscopy [HIV test declined] : HIV test declined [Hepatitis C test declined] : Hepatitis C test declined [With Significant Other] : lives with significant other [] :  [Employed] : employed [Fully functional (bathing, dressing, toileting, transferring, walking, feeding)] : Fully functional (bathing, dressing, toileting, transferring, walking, feeding) [Fully functional (using the telephone, shopping, preparing meals, housekeeping, doing laundry, using] : Fully functional and needs no help or supervision to perform IADLs (using the telephone, shopping, preparing meals, housekeeping, doing laundry, using transportation, managing medications and managing finances) [Patient/Caregiver unclear of wishes] : Patient/Caregiver unclear of wishes [] : No [de-identified] : normal [de-identified] : walking [Change in mental status noted] : No change in mental status noted [[id number]] : 0 [None] : None [Reports changes in dental health] : Reports no changes in dental health [Reports changes in vision] : Reports no changes in vision [Reports changes in hearing] : Reports no changes in hearing [ColonoscopyComments] : Plans to schedule one once recovered.  [AdvancecareDate] : 06/20

## 2020-06-23 NOTE — PHYSICAL EXAM
[No Lymphadenopathy] : no lymphadenopathy [Clear to Auscultation] : lungs were clear to auscultation bilaterally [Normal Rate] : normal rate  [Regular Rhythm] : with a regular rhythm [Normal S1, S2] : normal S1 and S2 [No Edema] : there was no peripheral edema [No Murmur] : no murmur heard [Normal] : affect was normal and insight and judgment were intact

## 2020-06-26 ENCOUNTER — RESULT REVIEW (OUTPATIENT)
Age: 60
End: 2020-06-26

## 2020-06-26 ENCOUNTER — APPOINTMENT (OUTPATIENT)
Dept: PULMONOLOGY | Facility: CLINIC | Age: 60
End: 2020-06-26
Payer: COMMERCIAL

## 2020-06-26 ENCOUNTER — TRANSCRIPTION ENCOUNTER (OUTPATIENT)
Age: 60
End: 2020-06-26

## 2020-06-26 ENCOUNTER — APPOINTMENT (OUTPATIENT)
Dept: HEMATOLOGY ONCOLOGY | Facility: CLINIC | Age: 60
End: 2020-06-26
Payer: COMMERCIAL

## 2020-06-26 VITALS
TEMPERATURE: 97 F | OXYGEN SATURATION: 99 % | RESPIRATION RATE: 20 BRPM | BODY MASS INDEX: 26.96 KG/M2 | HEART RATE: 78 BPM | SYSTOLIC BLOOD PRESSURE: 157 MMHG | HEIGHT: 67.99 IN | WEIGHT: 177.9 LBS | DIASTOLIC BLOOD PRESSURE: 93 MMHG

## 2020-06-26 VITALS
TEMPERATURE: 97.9 F | OXYGEN SATURATION: 99 % | DIASTOLIC BLOOD PRESSURE: 92 MMHG | SYSTOLIC BLOOD PRESSURE: 130 MMHG | HEART RATE: 65 BPM

## 2020-06-26 PROCEDURE — 99215 OFFICE O/P EST HI 40 MIN: CPT

## 2020-06-26 RX ORDER — AZITHROMYCIN 500 MG/1
500 TABLET, FILM COATED ORAL
Qty: 5 | Refills: 0 | Status: COMPLETED | COMMUNITY
Start: 2020-05-14

## 2020-06-26 NOTE — REASON FOR VISIT
[Follow-Up - From Hospitalization] : a follow-up visit after a recent hospitalization [Pneumonia] : pneumonia [Sleep Apnea] : sleep apnea [Pulmonary Embolism] : pulmonary embolism [TextBox_44] : COVID infection, respiratory failure

## 2020-06-26 NOTE — HISTORY OF PRESENT ILLNESS
[Never] : never [TextBox_4] : 60 year old male with HTN, CKD, MARILUZ came for f/u.\par he was hospitalized 3 weeks ago in [place] with acute hypoxemic respiratory failure due to COVID infection and PE.\par he required HFNC for almost 1 week, then he started to improve.\par Discharged on 06/17 on full AC\par Feels better each day. yesterday he walked for 100 yards and POX dropped 88%. O2 saturation goes up immediately.\par At rest POX is %\par Uses CPAP at night.\par he is off O2 since discharge.\par No fever, chills, no hemoptysis. Takes Eliquis for PE.\par \par \par \par SHX: never a smoker

## 2020-06-26 NOTE — PHYSICAL EXAM
[No Acute Distress] : no acute distress [No Neck Mass] : no neck mass [Normal Rate/Rhythm] : normal rate/rhythm [Normal Appearance] : normal appearance [Normal S1, S2] : normal s1, s2 [No Acc Muscle Use] : no acc muscle use [No Murmurs] : no murmurs [No Resp Distress] : no resp distress [No Abnormalities] : no abnormalities [Benign] : benign [Clear to Auscultation Bilaterally] : clear to auscultation bilaterally [Not Tender] : not tender [Normal Gait] : normal gait [No HSM] : no hsm [No Clubbing] : no clubbing [No Cyanosis] : no cyanosis [No Focal Deficits] : no focal deficits [No Edema] : no edema [Oriented x3] : oriented x3 [Normal Affect] : normal affect

## 2020-06-30 NOTE — ASSESSMENT
[FreeTextEntry1] : # bilateral PE\par due to COVID 19\par continue eliquis for 3 months\par not on O2 any more\par will check d-dimer, CRP, ferritin\par \par #anemia\par will do work up at [facility]\par \par RTC in 2 months, at that time will check anemia work up, cbc with diff, cmp, ddimer\par \par

## 2020-06-30 NOTE — HISTORY OF PRESENT ILLNESS
[de-identified] : 60 year old male presents for hospital follow up of pulmonary embolus.  Patient was admitted to [facility] 6/3/2020 and discharged 6/17/2020 with a diagnosis of COVID 19.  During admission patient found to have PE, discharged on Eliquis.

## 2020-07-01 ENCOUNTER — TRANSCRIPTION ENCOUNTER (OUTPATIENT)
Age: 60
End: 2020-07-01

## 2020-07-29 ENCOUNTER — APPOINTMENT (OUTPATIENT)
Dept: PULMONOLOGY | Facility: CLINIC | Age: 60
End: 2020-07-29
Payer: COMMERCIAL

## 2020-07-29 ENCOUNTER — RESULT REVIEW (OUTPATIENT)
Age: 60
End: 2020-07-29

## 2020-07-29 VITALS
DIASTOLIC BLOOD PRESSURE: 82 MMHG | WEIGHT: 183 LBS | HEIGHT: 67 IN | BODY MASS INDEX: 28.72 KG/M2 | TEMPERATURE: 97.8 F | SYSTOLIC BLOOD PRESSURE: 138 MMHG | OXYGEN SATURATION: 99 % | HEART RATE: 60 BPM

## 2020-07-29 PROCEDURE — 99214 OFFICE O/P EST MOD 30 MIN: CPT

## 2020-07-29 NOTE — HISTORY OF PRESENT ILLNESS
[Never] : never [TextBox_4] : 60 year old male with MARILUZ, PE, s/p COVID pneumonia and respiratory failure for f/u.\par Last seen in June 2020\par He has been off O2 since hospital d/c.\par Started working again beginning of July.\par He was tested COVID negative.\par Feels OK. Denies dyspnea, cough, CP. No hemoptysis.\par Compliant with CPAP.\par Sleeps well\par On AC for PE\par \par SHX: non smoker\par \par

## 2020-07-29 NOTE — PHYSICAL EXAM
[No Acute Distress] : no acute distress [Normal Appearance] : normal appearance [No Neck Mass] : no neck mass [Normal Rate/Rhythm] : normal rate/rhythm [Normal S1, S2] : normal s1, s2 [No Resp Distress] : no resp distress [No Murmurs] : no murmurs [No Acc Muscle Use] : no acc muscle use [Clear to Auscultation Bilaterally] : clear to auscultation bilaterally [Not Tender] : not tender [No Abnormalities] : no abnormalities [Benign] : benign [No HSM] : no hsm [Normal Gait] : normal gait [No Clubbing] : no clubbing [No Cyanosis] : no cyanosis [No Edema] : no edema [No Focal Deficits] : no focal deficits [Normal Affect] : normal affect [Oriented x3] : oriented x3

## 2020-07-30 ENCOUNTER — APPOINTMENT (OUTPATIENT)
Dept: FAMILY MEDICINE | Facility: CLINIC | Age: 60
End: 2020-07-30
Payer: COMMERCIAL

## 2020-07-30 VITALS
HEIGHT: 67 IN | HEART RATE: 62 BPM | BODY MASS INDEX: 28.41 KG/M2 | OXYGEN SATURATION: 97 % | WEIGHT: 181 LBS | SYSTOLIC BLOOD PRESSURE: 120 MMHG | DIASTOLIC BLOOD PRESSURE: 80 MMHG

## 2020-07-30 VITALS — TEMPERATURE: 99.2 F

## 2020-07-30 PROCEDURE — 36415 COLL VENOUS BLD VENIPUNCTURE: CPT

## 2020-07-30 PROCEDURE — 99214 OFFICE O/P EST MOD 30 MIN: CPT | Mod: 25

## 2020-07-30 RX ORDER — APIXABAN 5 MG/1
5 TABLET, FILM COATED ORAL
Refills: 0 | Status: DISCONTINUED | COMMUNITY
End: 2020-07-30

## 2020-07-30 NOTE — REVIEW OF SYSTEMS
[Fatigue] : fatigue [Recent Change In Weight] : ~T recent weight change [Shortness Of Breath] : shortness of breath [Melena] : melmarcelino [Negative] : Psychiatric

## 2020-07-30 NOTE — HISTORY OF PRESENT ILLNESS
[FreeTextEntry1] : Follow up [de-identified] : Pt was in the hospital 2 weeks with COVID.  Thought he was going to die.  Was going to be sent home with O2 but his levels stabilized.  Discharged on June 17th.\par Still has some shortness of breath randomly.  Yawns a lot.  Had pulm testing before COVID and was told the symptoms at the time weren't from his lungs but maybe cardiac.\par Had some weakness and fatigue.  That has mostly resolved.  \par Walking a mile a day and is doing well.\par Had bilateral pulm embolisms.  Taking blood thinners as directed . Saw pulm yesterday.  Got cxr.\par Blood in stool.  Has appt with Dr. Fernando next week.\par Intersted in testing all labs today.  Wants to check for anemia due to bleeding.  Also is worried about his diabetes level due to recent increased calorie consumption to regain weight lost during COVID.\par

## 2020-07-30 NOTE — PHYSICAL EXAM
[Normal Mood] : the mood was normal [Normal] : normal gait, coordination grossly intact, no focal deficits and deep tendon reflexes were 2+ and symmetric [de-identified] : Basline blunting of affect

## 2020-08-03 LAB
ALBUMIN SERPL ELPH-MCNC: 5 G/DL
ALP BLD-CCNC: 55 U/L
ALT SERPL-CCNC: 16 U/L
ANION GAP SERPL CALC-SCNC: 13 MMOL/L
AST SERPL-CCNC: 26 U/L
BASOPHILS # BLD AUTO: 0.03 K/UL
BASOPHILS NFR BLD AUTO: 0.7 %
BILIRUB SERPL-MCNC: 0.5 MG/DL
BUN SERPL-MCNC: 15 MG/DL
CALCIUM SERPL-MCNC: 9.9 MG/DL
CHLORIDE SERPL-SCNC: 100 MMOL/L
CHOLEST SERPL-MCNC: 129 MG/DL
CHOLEST/HDLC SERPL: 2.5 RATIO
CK SERPL-CCNC: 244 U/L
CO2 SERPL-SCNC: 27 MMOL/L
CREAT SERPL-MCNC: 1.22 MG/DL
EOSINOPHIL # BLD AUTO: 0.03 K/UL
EOSINOPHIL NFR BLD AUTO: 0.7 %
ESTIMATED AVERAGE GLUCOSE: 114 MG/DL
GLUCOSE SERPL-MCNC: 109 MG/DL
HBA1C MFR BLD HPLC: 5.6 %
HCT VFR BLD CALC: 44.1 %
HDLC SERPL-MCNC: 53 MG/DL
HGB BLD-MCNC: 13.7 G/DL
IMM GRANULOCYTES NFR BLD AUTO: 0.2 %
LDLC SERPL CALC-MCNC: 67 MG/DL
LYMPHOCYTES # BLD AUTO: 1.32 K/UL
LYMPHOCYTES NFR BLD AUTO: 32.8 %
MAN DIFF?: NORMAL
MCHC RBC-ENTMCNC: 31.1 GM/DL
MCHC RBC-ENTMCNC: 31.4 PG
MCV RBC AUTO: 101.1 FL
MONOCYTES # BLD AUTO: 0.4 K/UL
MONOCYTES NFR BLD AUTO: 10 %
NEUTROPHILS # BLD AUTO: 2.23 K/UL
NEUTROPHILS NFR BLD AUTO: 55.6 %
PLATELET # BLD AUTO: 158 K/UL
POTASSIUM SERPL-SCNC: 5 MMOL/L
PROT SERPL-MCNC: 7.6 G/DL
RBC # BLD: 4.36 M/UL
RBC # FLD: 14.6 %
SODIUM SERPL-SCNC: 141 MMOL/L
TRIGL SERPL-MCNC: 48 MG/DL
URATE SERPL-MCNC: 8 MG/DL
VALPROATE SERPL-MCNC: 90 UG/ML
WBC # FLD AUTO: 4.02 K/UL

## 2020-08-05 ENCOUNTER — APPOINTMENT (OUTPATIENT)
Dept: GASTROENTEROLOGY | Facility: CLINIC | Age: 60
End: 2020-08-05
Payer: COMMERCIAL

## 2020-08-05 VITALS
HEART RATE: 68 BPM | DIASTOLIC BLOOD PRESSURE: 80 MMHG | HEIGHT: 68 IN | OXYGEN SATURATION: 98 % | WEIGHT: 181 LBS | SYSTOLIC BLOOD PRESSURE: 130 MMHG | TEMPERATURE: 98.7 F | BODY MASS INDEX: 27.43 KG/M2

## 2020-08-05 PROCEDURE — 99243 OFF/OP CNSLTJ NEW/EST LOW 30: CPT

## 2020-08-05 RX ORDER — ALBUTEROL SULFATE 1.25 MG/3ML
1.25 SOLUTION RESPIRATORY (INHALATION)
Qty: 75 | Refills: 0 | Status: DISCONTINUED | COMMUNITY
Start: 2020-05-23 | End: 2020-08-05

## 2020-08-05 NOTE — ASSESSMENT
[FreeTextEntry1] : Blood in stool.  Colonoscopy to be scheduled. Discussed Eliquis with Dr. Tigist Kraus...can hold for 48 hours prior to procedure...preferably no sooner than end of August (also sees Dr. Reyes). Patient given info today but unable to schedule because he is not sure of his availability.  Patient aware that a delay his procedure could result in a delay in diagnosis of colonic patholgy

## 2020-08-05 NOTE — PHYSICAL EXAM
[General Appearance - Alert] : alert [General Appearance - In No Acute Distress] : in no acute distress [Outer Ear] : the ears and nose were normal in appearance [Sclera] : the sclera and conjunctiva were normal [Neck Appearance] : the appearance of the neck was normal [Abdomen Soft] : soft [] : no respiratory distress [No Focal Deficits] : no focal deficits [Oriented To Time, Place, And Person] : oriented to person, place, and time [FreeTextEntry1] : deferred pending  colonoscopy

## 2020-08-05 NOTE — CONSULT LETTER
[Consult Letter:] : I had the pleasure of evaluating your patient, [unfilled]. [Dear  ___] : Dear  [unfilled], [Consult Closing:] : Thank you very much for allowing me to participate in the care of this patient.  If you have any questions, please do not hesitate to contact me. [Please see my note below.] : Please see my note below. [Sincerely,] : Sincerely, [FreeTextEntry3] : Irineo Fernando MD\par tel: [phone]\par fax: [phone]\par

## 2020-08-05 NOTE — HISTORY OF PRESENT ILLNESS
[de-identified] : GERALDINE CELESTIN  is being evaluated at the request of Dr. Redding for an opinion re: blood in stool. Patient has noted intermittent BRBPR for the past 3 months. Of note was COVID positive and admitted  6/3-6/17/20. Course complicated by bilateral pulmonary emboli for which he has been on Eliquis. Denies nausea, vomiting, fever, chills, diarrhea, constipation, melena, hematemesis. Apparently rested COVID negative on June 29th. The patient has never had a colonoscopy\par

## 2020-08-28 ENCOUNTER — RESULT REVIEW (OUTPATIENT)
Age: 60
End: 2020-08-28

## 2020-08-29 ENCOUNTER — RESULT REVIEW (OUTPATIENT)
Age: 60
End: 2020-08-29

## 2020-08-30 ENCOUNTER — RESULT REVIEW (OUTPATIENT)
Age: 60
End: 2020-08-30

## 2020-08-31 ENCOUNTER — APPOINTMENT (OUTPATIENT)
Dept: GASTROENTEROLOGY | Facility: HOSPITAL | Age: 60
End: 2020-08-31

## 2020-09-09 ENCOUNTER — APPOINTMENT (OUTPATIENT)
Dept: HEMATOLOGY ONCOLOGY | Facility: CLINIC | Age: 60
End: 2020-09-09
Payer: COMMERCIAL

## 2020-09-09 ENCOUNTER — RESULT REVIEW (OUTPATIENT)
Age: 60
End: 2020-09-09

## 2020-09-09 VITALS
HEIGHT: 68 IN | OXYGEN SATURATION: 100 % | DIASTOLIC BLOOD PRESSURE: 80 MMHG | WEIGHT: 181 LBS | HEART RATE: 68 BPM | RESPIRATION RATE: 18 BRPM | BODY MASS INDEX: 27.43 KG/M2 | TEMPERATURE: 98.2 F | SYSTOLIC BLOOD PRESSURE: 123 MMHG

## 2020-09-09 PROCEDURE — 99215 OFFICE O/P EST HI 40 MIN: CPT

## 2020-09-10 ENCOUNTER — RESULT REVIEW (OUTPATIENT)
Age: 60
End: 2020-09-10

## 2020-09-14 ENCOUNTER — APPOINTMENT (OUTPATIENT)
Dept: HEMATOLOGY ONCOLOGY | Facility: CLINIC | Age: 60
End: 2020-09-14
Payer: COMMERCIAL

## 2020-09-14 ENCOUNTER — APPOINTMENT (OUTPATIENT)
Dept: SURGERY | Facility: CLINIC | Age: 60
End: 2020-09-14
Payer: COMMERCIAL

## 2020-09-14 VITALS
OXYGEN SATURATION: 100 % | SYSTOLIC BLOOD PRESSURE: 145 MMHG | HEIGHT: 68 IN | DIASTOLIC BLOOD PRESSURE: 92 MMHG | HEART RATE: 73 BPM

## 2020-09-14 VITALS
HEIGHT: 68 IN | HEART RATE: 60 BPM | WEIGHT: 180 LBS | TEMPERATURE: 97.7 F | RESPIRATION RATE: 18 BRPM | SYSTOLIC BLOOD PRESSURE: 137 MMHG | DIASTOLIC BLOOD PRESSURE: 78 MMHG | OXYGEN SATURATION: 99 % | BODY MASS INDEX: 27.28 KG/M2

## 2020-09-14 DIAGNOSIS — J12.82 COVID-19: ICD-10-CM

## 2020-09-14 DIAGNOSIS — U07.1 COVID-19: ICD-10-CM

## 2020-09-14 PROCEDURE — 99204 OFFICE O/P NEW MOD 45 MIN: CPT

## 2020-09-14 PROCEDURE — 99215 OFFICE O/P EST HI 40 MIN: CPT

## 2020-09-14 NOTE — ASSESSMENT
[FreeTextEntry1] : # bilateral PE\par due to COVID 19\par continue eliquis for 3 months\par not on O2 any more\par 8/29/20 - CT chest - significant improvement in Ground glass interstitial peripheral infiltrates with minimal residual hazy granularity in the EMMANUEL and to lesser extent RUL. Findings suggest chronic vs interval residual or prior pneumonitis\par d-dimer negative - will stop eliquis at end of Sept. which will be 3 months\par follows with Dr. Sandy.\par \par #anemia\par bled significantly after CNY. hgb 13.7 -->9.3\par he will get 3 doses of IV iron and then I will re-assess\par \par #rectal adenocarcinoma found on CNY\par followed up with Dr. Dennis\par 8/29/20 - CT chest - significant improvement in Ground glass interstitial peripheral infiltrates with minimal residual hazy granularity in the EMMANUEL and to lesser extent RUL. Findings suggest chronic vs interval residual or prior pneumonitis\par CT A/P - no suspicious mass or adenopathy within the A/P. \par CEA - 1.6\par Reviewed path - rectal polyp invasive adenocarcinoma arising in a tubulovillous adenoma, up to 1.5 cm in largest fragment. Moderately differentiated, invades into the submucosa, at the cauterized base of polyp.LVI not identified\par DW Dr. Dennis - plan for rigid sig to assess scar with biopsy (path shows invasive carcinoma at the cauterized base of polyp) and MRI A/P. \par Pending results may be able to do transanal local excision vs complete treatment with chemoRT, surgery and adjuvant chemo\par will discuss at Multidisciplinary TB\par Surveillance with transanal local excision would be proctoscopy with MRI with contrast/EUS q 3-6 months for the first 2 y then every 6 mo for a total of 5 yr. CNY 1 year after surgery. If full surgery - CNY at 1 year after surgery\par  \par RTC in 2 weeks with cbc with diff and cmp, to discuss findings of scope and MRI and review TB discussion

## 2020-09-14 NOTE — CONSULT LETTER
[Dear  ___] : Dear  [unfilled], [Consult Letter:] : I had the pleasure of evaluating your patient, [unfilled]. [Please see my note below.] : Please see my note below. [Sincerely,] : Sincerely, [Consult Closing:] : Thank you very much for allowing me to participate in the care of this patient.  If you have any questions, please do not hesitate to contact me. [FreeTextEntry3] : Tigist Kraus DO, FACVITALIY, FACP\par Medical Oncology and Hematology\par [facility] [place]\par

## 2020-09-14 NOTE — DATA REVIEWED
[FreeTextEntry1] : Colonoscopy reviewed, CT chest/abd/pel reviewed as well as images. CEA normal.\par \par \par  [facility]             Final\par \par No Documents Attached\par \par \par \par \par   [facility]\par \par SPEC #: [id number]          RECD: 09/01/     STATUS: SOUT\par REQ #: [id number]            DEREK: 08/31/20-\par ENTERED:  09/01/    SP TYPE: SURGICAL\par SUBM DR: Irineo Fernando MD\par OTHR DR: Jb Redding MD\par \par \par \par FINAL DIAGNOSIS\par \par A. SIGMOID COLON POLYP:\par -  TUBULOVILLOUS ADENOMA\par \par B. RECTAL POLYP, FRAGMENTED SPECIMEN:\par -  INVASIVE ADENOCARCINOMA ARISING IN A TUBULOVILLOUS ADENOMA\par -  UP TO 1.5 CM IN THE LARGEST FRAGMENT OF POLYP\par -  MODERATELY DIFFERENTIATED\par -  INVADES INTO THE SUBMUCOSA\par -  PRESENT AT THE CAUTERIZED BASE OF THE POLYP\par -  INVOLVES THE LARGEST FRAGMENT OF POLYP AND SEEN AS DETACHED FRAGMENTS\par -  LYMPHOVASCULAR INVASION IS NOT IDENTIFIED\par \par MICROSCOPIC DESCRIPTION\par \par Microscopic performed\par ICD CODES\par \par C20\par Pre-Op Dx: Cancer screening.\par Post-Op Dx: Colon polyps. Hemorrhoids. Diverticulosis.\par \par SPECIMEN(S) RECEIVED\par \par A. Specimen - SIGMOID POLYP COLD SNARE\par B. Specimen - RECTAL POLYP HOT SNARE\par \par \par The specimen consists  of two parts,  received in formalin  identified  with  the patient's\par name  and date of birth.\par \par A. Labeled "sigmoid colon polyp by cold snare" and consists of two fragments of light tan\par polypoid soft tissue measuring 0.2 and 0.5 cm. The specimen is entirely submitted in one\par cassette.\par \par B. Labeled "rectal polyp by hot snare" and consists of one tan-brown polypoid fragment\par measuring 2.5 x 1.5 x 1 cm. Also received in the same container are multiple fragments of\par tan-brown polypoid soft tissue admixed with blood clot aggregating to 2 x 1.5 x 1 cm. The\par specimen is entirely submitted as follows: B1-B4- largest polypoid fragment (2.5 cm)\par entirely submitted; B5-B6- remaining tissue admixed with blood clot. Total six cassettes for\par part B.\par \par Asha Regan\par 09/02/20 - 1240\par \par \par \par \par Specimen: [id number]            Received: 09/01/                (Continued)\par \par --------------------------------------------------------------------------------------------\par \par Signed __________(signature on file)___________ Asha Regan 09/02/20 1300\par \par --------------------------------------------------------------------------------------------\par \par  \par \par  Ordered by: IRINEO FERNANDO       Collected/Examined: [id number] 12:00AM       \par Verified by: IRINEO FERNANDO [id number] 01:21PM       \par  Result Communication: No patient communication needed at this time;\par Stage: Final       \par  Performed at: [facility] (Med Director: Harshad Lerma M.D.)       Resulted: 02Sep2020 01:00PM       Last Updated: 02Sep2020 01:21PM       Accession: [id number]       \par \par

## 2020-09-14 NOTE — HISTORY OF PRESENT ILLNESS
[FreeTextEntry1] : 60 M here for initial evaluation for rectal cancer. Patient underwent recent colonoscopy with Dr Fernando. \par \par Had approximately 2.5 cm polyp at 10 cm that was removed endoscopically. pathology came back as invasive adenocarcinoma with cancer at the margin. Patient recently underwent chest CT as part of follow up for COVID in July which was negative for metastatic disease and had CT abdomen pelvis which  also shows no metastatic disease. \par \par Patient was admitted to [place] with [facility] and is continuing to recover his respiratory function. Is currently on Xarelto for PE which will be stopped at the end of September. Patient had bleeding symptoms prior to Covid admission but could not get colonoscopy at that time. Bled more once on blood thinners prompting more urgent colonoscopy. \par \par Patient currently feels well, bleeding has stopped since colonoscopy. Denies prior surgical history in his abdomen. No nausea/vomiting/weight loss/decreased appetite. \par Exercise tolerance improving, able to walk a couple blocks without issue, goes up and down stairs. No smoking history.

## 2020-09-14 NOTE — CONSULT LETTER
[Dear  ___] : Dear  [unfilled], [Consult Letter:] : I had the pleasure of evaluating your patient, [unfilled]. [Please see my note below.] : Please see my note below. [Consult Closing:] : Thank you very much for allowing me to participate in the care of this patient.  If you have any questions, please do not hesitate to contact me. [Sincerely,] : Sincerely, [FreeTextEntry3] : Kal Dennis MD

## 2020-09-14 NOTE — HISTORY OF PRESENT ILLNESS
[de-identified] : Patient seen and examined and here today for follow up\meg Has appt with Dr. Dennis.\par Had CT - reviewed\par  [de-identified] : 60 year old male presents for hospital follow up of pulmonary embolus.  Patient was admitted to [facility] 6/3/2020 and discharged 6/17/2020 with a diagnosis of COVID 19.  During admission patient found to have PE, discharged on Eliquis.

## 2020-09-14 NOTE — ASSESSMENT
[FreeTextEntry1] : # bilateral PE\par due to COVID 19\par continue eliquis for 3 months\par not on O2 any more\par 8/29/20 - CT chest - significant improvement in Ground glass interstitial peripheral infiltrates with minimal residual hazy granularity in the EMMANUEL and to lesser extent RUL. Findings suggest chronic vs interval residual or prior pneumonitis\par d-dimer negative - will stop eliquis at end of Sept. which will be 3 months\par follows with Dr. Sandy.\par \par #anemia\par bled significantly after CNY - will give IV iron\par \par #colon cancer found on CNY\par follow up with Dr. Dennis\par 8/29/20 - CT chest - significant improvement in Ground glass interstitial peripheral infiltrates with minimal residual hazy granularity in the EMMANUEL and to lesser extent RUL. Findings suggest chronic vs interval residual or prior pneumonitis\par CT A/P tomorrow\par check CEA\par \par RTC in 1 week to discuss lab work, findings of CT, to see how he is tolerating iron, and to discuss consult with Dr. Dennis

## 2020-09-14 NOTE — CONSULT LETTER
[Dear  ___] : Dear  [unfilled], [Consult Letter:] : I had the pleasure of evaluating your patient, [unfilled]. [Please see my note below.] : Please see my note below. [Consult Closing:] : Thank you very much for allowing me to participate in the care of this patient.  If you have any questions, please do not hesitate to contact me. [Sincerely,] : Sincerely, [FreeTextEntry3] : Tigist Kraus DO, FACVTIALIY, FACP\par Medical Oncology and Hematology\par [facility] [place]\par

## 2020-09-14 NOTE — PHYSICAL EXAM
[Exam Deferred] : exam was deferred [Respiratory Effort] : normal respiratory effort [Normal Rate and Rhythm] : normal rate and rhythm [Alert] : alert [Calm] : calm [Abdomen Masses] : No abdominal masses [Abdomen Tenderness] : ~T No ~M abdominal tenderness [JVD] : no jugular venous distention  [de-identified] : No acute distress

## 2020-09-14 NOTE — HISTORY OF PRESENT ILLNESS
[de-identified] : 60 year old male presents for hospital follow up of pulmonary embolus.  Patient was admitted to [place] 6/3/2020 and discharged 6/17/2020 with a diagnosis of COVID 19.  During admission patient found to have PE, discharged on Eliquis.   [de-identified] : Patient seen and examined and here today for follow up\par had CNY for blood in stool - polyp removed - found to have colon CA\par Has appt with Dr. Dennis\par Bled after CNY and was sent to ED.\par

## 2020-09-14 NOTE — ASSESSMENT
[FreeTextEntry1] : 60 M here for assessment for polypectomy with invasive cancer 10cm from anal verge. \par \par Patient unable to wait for rigid procotsigmoidoscopy today, will return next week to complete exam. \par Recommend MRI pelvis for staging of rectal cancer including any pelvic lymphadenopathy. \par Patient likely has localized disease based on imaging so far. If no concerning lymphadenopathy and no significant tumor extension beyond wall of rectum, can consider local excision of scar. \par \par Discussed options of local excision vs total mesorectal excision. Would likely need a diverting loop ileostomy if patient underwent rectal resection given height of lesion. \par \par Will discuss patient's case in tumor board once MRI results completed.

## 2020-09-21 ENCOUNTER — APPOINTMENT (OUTPATIENT)
Dept: SURGERY | Facility: CLINIC | Age: 60
End: 2020-09-21
Payer: COMMERCIAL

## 2020-09-21 VITALS
WEIGHT: 180 LBS | TEMPERATURE: 98.7 F | HEART RATE: 59 BPM | HEIGHT: 68 IN | RESPIRATION RATE: 18 BRPM | OXYGEN SATURATION: 100 % | BODY MASS INDEX: 27.28 KG/M2 | DIASTOLIC BLOOD PRESSURE: 83 MMHG | SYSTOLIC BLOOD PRESSURE: 125 MMHG

## 2020-09-21 PROCEDURE — 45300 PROCTOSIGMOIDOSCOPY DX: CPT

## 2020-09-21 PROCEDURE — 99211 OFF/OP EST MAY X REQ PHY/QHP: CPT | Mod: 25

## 2020-09-26 ENCOUNTER — RESULT REVIEW (OUTPATIENT)
Age: 60
End: 2020-09-26

## 2020-09-28 NOTE — PROCEDURE
[FreeTextEntry1] : Rigid proctosigmoidoscopy performed: Scope advanced to 12-15 cm only able to visualize tattoo, could not identify prior lesion with difficulty from turn of rectum/sigmoid.

## 2020-09-28 NOTE — HISTORY OF PRESENT ILLNESS
[FreeTextEntry1] : Patient here for follow up for rigid proctosigmoidoscopy to assess polypectomy site.

## 2020-09-28 NOTE — ASSESSMENT
[FreeTextEntry1] : 60 M with likely high rectal cancer after partial polypectomy during colonoscopy. Unable to visualize scar/residual polyp here in the office with rigid procto. \par \par \par Will plan for flex sig to fully identify site and height of tumor. If high enough in rectum may be best to go ahead with rectosigmoid resection and primary anastomosis as a transanal resection may not be feasible. \par Patient is awaiting authorization for MRI as well. Will get imaging to further assess depth of invasion if any noted. \par \par Risks and benefits of flexible sigmoidoscopy discussed. \par Patient will do enema at home and repeat in endoscopy suite, no need for full bowel prep.

## 2020-09-29 ENCOUNTER — RESULT REVIEW (OUTPATIENT)
Age: 60
End: 2020-09-29

## 2020-09-29 ENCOUNTER — APPOINTMENT (OUTPATIENT)
Dept: HEMATOLOGY ONCOLOGY | Facility: CLINIC | Age: 60
End: 2020-09-29
Payer: COMMERCIAL

## 2020-09-29 VITALS
SYSTOLIC BLOOD PRESSURE: 116 MMHG | OXYGEN SATURATION: 99 % | HEIGHT: 67.99 IN | DIASTOLIC BLOOD PRESSURE: 67 MMHG | BODY MASS INDEX: 28.49 KG/M2 | RESPIRATION RATE: 16 BRPM | HEART RATE: 58 BPM | TEMPERATURE: 97.7 F | WEIGHT: 188 LBS

## 2020-09-29 DIAGNOSIS — N32.89 OTHER SPECIFIED DISORDERS OF BLADDER: ICD-10-CM

## 2020-09-29 PROCEDURE — 99214 OFFICE O/P EST MOD 30 MIN: CPT

## 2020-09-29 NOTE — CONSULT LETTER
[Dear  ___] : Dear  [unfilled], [Consult Letter:] : I had the pleasure of evaluating your patient, [unfilled]. [Please see my note below.] : Please see my note below. [Consult Closing:] : Thank you very much for allowing me to participate in the care of this patient.  If you have any questions, please do not hesitate to contact me. [Sincerely,] : Sincerely, [FreeTextEntry3] : Tigist Kraus DO, FACVITALIY, FACP\par Medical Oncology and Hematology\par [facility] [place]\par

## 2020-09-29 NOTE — ASSESSMENT
[FreeTextEntry1] : # bilateral PE\par due to COVID 19\par not on O2 any more\par 8/29/20 - CT chest - significant improvement in Ground glass interstitial peripheral infiltrates with minimal residual hazy granularity in the EMMANUEL and to lesser extent RUL. Findings suggest chronic vs interval residual or prior pneumonitis\par d-dimer negative - stope eliquis given anemia and rectal bleeding. Completed 3 months.\par follows with Dr. Sandy.\par \par #anemia\par bled significantly after CNY. hgb 13.7 -->9.3.\par Received 3 doses of iron hgb improved to 11.7. \par Hgb improving - will give last 2 doses of iron. Does not want medications for nausea\par \par #bladder wall thickening on CT\par will check UA - if neg for blood will observe. If hematuria will refer to urology\par \par #rectal adenocarcinoma found on CNY\par followed up with Dr. Dennis\par 8/29/20 - CT chest - significant improvement in Ground glass interstitial peripheral infiltrates with minimal residual hazy granularity in the EMMANUEL and to lesser extent RUL. Findings suggest chronic vs interval residual or prior pneumonitis\par CT A/P - no suspicious mass or adenopathy within the A/P. \par CEA - 1.6\par Reviewed path - rectal polyp invasive adenocarcinoma arising in a tubulovillous adenoma, up to 1.5 cm in largest fragment. Moderately differentiated, invades into the submucosa, at the cauterized base of polyp.LVI not identified\par DW Dr. Dennis - plan for rigid sig to assess scar with biopsy (path shows invasive carcinoma at the cauterized base of polyp) and MRI A/P. \par Pending results may be able to do transanal local excision vs complete treatment with chemoRT, surgery and adjuvant chemo\par will discuss at Multidisciplinary TB\par Surveillance with transanal local excision would be proctoscopy with MRI with contrast/EUS q 3-6 months for the first 2 y then every 6 mo for a total of 5 yr. CNY 1 year after surgery. If full surgery - CNY at 1 year after surgery\par  \par RTC in 3 weeks with cbc with diff and cmp, to discuss findings of scope and MRI and review TB discussion

## 2020-09-29 NOTE — HISTORY OF PRESENT ILLNESS
[de-identified] : Patient seen and examined and here today for follow up\par Pending Approval of MRI by insurance company\par Scheduled with Dr. Dennis for sigmoidoscopy\par Received 3/5 venofers. Has some nausea associated but does not want more\par  [de-identified] : 60 year old male presents for hospital follow up of pulmonary embolus.  Patient was admitted to [facility] 6/3/2020 and discharged 6/17/2020 with a diagnosis of COVID 19.  During admission patient found to have PE, discharged on Eliquis.

## 2020-10-06 ENCOUNTER — RESULT REVIEW (OUTPATIENT)
Age: 60
End: 2020-10-06

## 2020-10-15 ENCOUNTER — APPOINTMENT (OUTPATIENT)
Dept: PULMONOLOGY | Facility: CLINIC | Age: 60
End: 2020-10-15
Payer: COMMERCIAL

## 2020-10-15 ENCOUNTER — RESULT REVIEW (OUTPATIENT)
Age: 60
End: 2020-10-15

## 2020-10-15 VITALS
WEIGHT: 178 LBS | BODY MASS INDEX: 26.98 KG/M2 | TEMPERATURE: 97.7 F | HEIGHT: 67.99 IN | OXYGEN SATURATION: 98 % | SYSTOLIC BLOOD PRESSURE: 130 MMHG | HEART RATE: 67 BPM | DIASTOLIC BLOOD PRESSURE: 72 MMHG

## 2020-10-15 PROCEDURE — 99215 OFFICE O/P EST HI 40 MIN: CPT

## 2020-10-15 RX ORDER — APIXABAN 5 MG/1
5 TABLET, FILM COATED ORAL
Qty: 60 | Refills: 2 | Status: DISCONTINUED | COMMUNITY
Start: 2020-06-26 | End: 2020-10-15

## 2020-10-15 NOTE — PHYSICAL EXAM
[No Acute Distress] : no acute distress [Normal Oropharynx] : normal oropharynx [No JVD] : no jvd [Normal Appearance] : normal appearance [Normal Rate/Rhythm] : normal rate/rhythm [Normal S1, S2] : normal s1, s2 [No Resp Distress] : no resp distress [No Murmurs] : no murmurs [No Abnormalities] : no abnormalities [Clear to Auscultation Bilaterally] : clear to auscultation bilaterally [No Acc Muscle Use] : no acc muscle use [No HSM] : no hsm [Benign] : benign [Normal Gait] : normal gait [No Clubbing] : no clubbing [No Edema] : no edema [No Cyanosis] : no cyanosis [No Focal Deficits] : no focal deficits [Normal Turgor] : normal turgor [Oriented x3] : oriented x3 [Normal Affect] : normal affect

## 2020-10-15 NOTE — REASON FOR VISIT
[Follow-Up] : a follow-up visit [Sleep Apnea] : sleep apnea [Abnormal CXR/ Chest CT] : an abnormal CXR/ chest CT

## 2020-10-15 NOTE — HISTORY OF PRESENT ILLNESS
[TextBox_4] : 60 year old male with MARILUZ, PE provoked by COVID infection, s/p respiratory failure due to COVID, MARILUZ for f/u.\par Last seen in July.\par He had a CT chest recently.\par Was recently diagnosed with colon CA and follows up with [facility] and [facility]\par PMD Dr Redding.\par C/o dyspnea on exertion same as before.\par No cough.\par No hemoptysis.\par Compliant with PAP.\par \par SHX: non smoker\par

## 2020-10-21 ENCOUNTER — APPOINTMENT (OUTPATIENT)
Dept: HEMATOLOGY ONCOLOGY | Facility: CLINIC | Age: 60
End: 2020-10-21
Payer: COMMERCIAL

## 2020-10-21 ENCOUNTER — RESULT REVIEW (OUTPATIENT)
Age: 60
End: 2020-10-21

## 2020-10-21 VITALS
HEART RATE: 54 BPM | WEIGHT: 187.99 LBS | OXYGEN SATURATION: 100 % | SYSTOLIC BLOOD PRESSURE: 140 MMHG | TEMPERATURE: 98.7 F | HEIGHT: 67.95 IN | RESPIRATION RATE: 18 BRPM | DIASTOLIC BLOOD PRESSURE: 85 MMHG | BODY MASS INDEX: 28.49 KG/M2

## 2020-10-21 DIAGNOSIS — R93.89 ABNORMAL FINDINGS ON DIAGNOSTIC IMAGING OF OTHER SPECIFIED BODY STRUCTURES: ICD-10-CM

## 2020-10-21 PROCEDURE — 99215 OFFICE O/P EST HI 40 MIN: CPT

## 2020-10-21 PROCEDURE — 99072 ADDL SUPL MATRL&STAF TM PHE: CPT

## 2020-10-22 ENCOUNTER — APPOINTMENT (OUTPATIENT)
Dept: SURGERY | Facility: CLINIC | Age: 60
End: 2020-10-22
Payer: COMMERCIAL

## 2020-10-22 VITALS
HEART RATE: 58 BPM | DIASTOLIC BLOOD PRESSURE: 75 MMHG | WEIGHT: 187 LBS | OXYGEN SATURATION: 97 % | BODY MASS INDEX: 29.35 KG/M2 | SYSTOLIC BLOOD PRESSURE: 133 MMHG | HEIGHT: 67 IN | RESPIRATION RATE: 16 BRPM

## 2020-10-22 PROCEDURE — 99214 OFFICE O/P EST MOD 30 MIN: CPT

## 2020-10-22 NOTE — HISTORY OF PRESENT ILLNESS
[de-identified] : Patient seen and examined and here today for follow up\par Does not have any complaints today\par s/p flex sig. \par s/p MRI Pelvis [de-identified] : 60 year old male presents for hospital follow up of pulmonary embolus.  Patient was admitted to [facility] 6/3/2020 and discharged 6/17/2020 with a diagnosis of COVID 19.  During admission patient found to have PE, discharged on Eliquis.

## 2020-10-22 NOTE — ASSESSMENT
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 Dennis\par 8/29/20 - CT chest - significant improvement in Ground glass interstitial peripheral infiltrates with minimal residual hazy granularity in the EMMANUEL and to lesser extent RUL. Findings suggest chronic vs interval residual or prior pneumonitis\par CT A/P - no suspicious mass or adenopathy within the A/P. \par CEA - 1.6\par Reviewed path - rectal polyp invasive adenocarcinoma arising in a tubulovillous adenoma, up to 1.5 cm in largest fragment. Moderately differentiated, invades into the submucosa, at the cauterized base of polyp.LVI not identified\par \par 10/9 - flex sig by Dr. Dennis\par \par Discussed at [facility] - mass at R heart border picked up by radiology on review of scans due to h/o PE. 8/2019 - CTA Heart - There is an extracardiac mass adjacent to the lateral aspect of the RA and to the lateral and anterior aspect of the SVC. There is no contrast uptake with in the mass. 5.2 x 1.7 x 6.1 cm cystic mass is noted contiguous with the R heart border.\par \par MRI A/P - 10/15/20 - high rectal tumor, T2, No visible mesorectal/superior rectal LN or Extramesorectal LN.\par \par Surveillance with transanal local excision would be proctoscopy with MRI with contrast/EUS q 3-6 months for the first 2 y then every 6 mo for a total of 5 yr. CNY 1 year after surgery. If full surgery - CNY at 1 year after surgery\par \par #pericardial mass - persistent. \par 8/2019 - CTA Heart - There is an extracardiac mass adjacent to the lateral aspect of the RA and to the lateral and anterior aspect of the SVC. There is no contrast uptake with in the mass. 5.2 x 1.7 x 6.1 cm cystic mass is noted contiguous with the R heart border.\par Will check PET CT.\par \par # bilateral PE\par due to COVID 19\par not on O2 any more\par 8/29/20 - CT chest - significant improvement in Ground glass interstitial peripheral infiltrates with minimal residual hazy granularity in the EMMANUEL and to lesser extent RUL. Findings suggest chronic vs interval residual or prior pneumonitis\par d-dimer negative - stop eliquis given anemia and rectal bleeding. Completed 3 months.\par follows with Dr. Sandy.\par \par #anemia\par bled significantly after CNY. hgb 13.7 -->9.3 -->11.7\par s/p iron\par \par #bladder wall thickening on CT\par UA neg for blood\par will monitor\par \par  \par RTC in 2 weeks to review PET

## 2020-10-22 NOTE — CONSULT LETTER
[Dear  ___] : Dear  [unfilled], [Consult Letter:] : I had the pleasure of evaluating your patient, [unfilled]. [Please see my note below.] : Please see my note below. [Consult Closing:] : Thank you very much for allowing me to participate in the care of this patient.  If you have any questions, please do not hesitate to contact me. [Sincerely,] : Sincerely, [FreeTextEntry3] : Tigist Kraus DO, FACVITALIY, FACP\par Medical Oncology and Hematology\par [facility] [place]\par

## 2020-10-28 ENCOUNTER — NON-APPOINTMENT (OUTPATIENT)
Age: 60
End: 2020-10-28

## 2020-11-02 ENCOUNTER — APPOINTMENT (OUTPATIENT)
Dept: HEMATOLOGY ONCOLOGY | Facility: CLINIC | Age: 60
End: 2020-11-02
Payer: COMMERCIAL

## 2020-11-06 ENCOUNTER — NON-APPOINTMENT (OUTPATIENT)
Age: 60
End: 2020-11-06

## 2020-11-10 ENCOUNTER — RESULT REVIEW (OUTPATIENT)
Age: 60
End: 2020-11-10

## 2020-11-10 ENCOUNTER — APPOINTMENT (OUTPATIENT)
Dept: FAMILY MEDICINE | Facility: CLINIC | Age: 60
End: 2020-11-10
Payer: COMMERCIAL

## 2020-11-10 VITALS — DIASTOLIC BLOOD PRESSURE: 82 MMHG | SYSTOLIC BLOOD PRESSURE: 126 MMHG

## 2020-11-10 VITALS
TEMPERATURE: 99 F | DIASTOLIC BLOOD PRESSURE: 90 MMHG | HEIGHT: 67 IN | SYSTOLIC BLOOD PRESSURE: 150 MMHG | WEIGHT: 189 LBS | BODY MASS INDEX: 29.66 KG/M2

## 2020-11-10 PROCEDURE — 99072 ADDL SUPL MATRL&STAF TM PHE: CPT

## 2020-11-10 PROCEDURE — 36415 COLL VENOUS BLD VENIPUNCTURE: CPT

## 2020-11-10 PROCEDURE — 99214 OFFICE O/P EST MOD 30 MIN: CPT | Mod: 25

## 2020-11-10 PROCEDURE — 93000 ELECTROCARDIOGRAM COMPLETE: CPT

## 2020-11-10 RX ORDER — MELATONIN 5 MG
5 CAPSULE ORAL
Refills: 0 | Status: DISCONTINUED | COMMUNITY
End: 2020-11-10

## 2020-11-12 LAB
ABO + RH PNL BLD: NORMAL
ALBUMIN SERPL ELPH-MCNC: 4.9 G/DL
ALP BLD-CCNC: 41 U/L
ALT SERPL-CCNC: 23 U/L
ANION GAP SERPL CALC-SCNC: 13 MMOL/L
APTT BLD: 30.8 SEC
AST SERPL-CCNC: 32 U/L
BASOPHILS # BLD AUTO: 0.03 K/UL
BASOPHILS NFR BLD AUTO: 0.5 %
BILIRUB SERPL-MCNC: 0.2 MG/DL
BUN SERPL-MCNC: 26 MG/DL
CALCIUM SERPL-MCNC: 9.8 MG/DL
CHLORIDE SERPL-SCNC: 99 MMOL/L
CO2 SERPL-SCNC: 28 MMOL/L
CREAT SERPL-MCNC: 1.2 MG/DL
EOSINOPHIL # BLD AUTO: 0.01 K/UL
EOSINOPHIL NFR BLD AUTO: 0.2 %
GLUCOSE SERPL-MCNC: 118 MG/DL
HCT VFR BLD CALC: 43.9 %
HGB BLD-MCNC: 14 G/DL
IMM GRANULOCYTES NFR BLD AUTO: 0.3 %
INR PPP: 0.85 RATIO
LYMPHOCYTES # BLD AUTO: 1.02 K/UL
LYMPHOCYTES NFR BLD AUTO: 16.2 %
MAN DIFF?: NORMAL
MCHC RBC-ENTMCNC: 31.8 PG
MCHC RBC-ENTMCNC: 31.9 GM/DL
MCV RBC AUTO: 99.8 FL
MONOCYTES # BLD AUTO: 0.23 K/UL
MONOCYTES NFR BLD AUTO: 3.6 %
NEUTROPHILS # BLD AUTO: 5 K/UL
NEUTROPHILS NFR BLD AUTO: 79.2 %
PLATELET # BLD AUTO: 134 K/UL
POTASSIUM SERPL-SCNC: 5.7 MMOL/L
PROT SERPL-MCNC: 7 G/DL
PT BLD: 10.1 SEC
RBC # BLD: 4.4 M/UL
RBC # FLD: 13.4 %
SODIUM SERPL-SCNC: 140 MMOL/L
WBC # FLD AUTO: 6.31 K/UL

## 2020-11-12 NOTE — REVIEW OF SYSTEMS
[Shortness Of Breath] : shortness of breath [Easy Bleeding] : easy bleeding [Negative] : Genitourinary [Cough] : no cough [Easy Bruising] : no easy bruising

## 2020-11-12 NOTE — RESULTS/DATA
[] : results reviewed [de-identified] : Nml [de-identified] : Nml [de-identified] : Nml [de-identified] : Nml [de-identified] : Normal

## 2020-11-12 NOTE — ASSESSMENT
[Patient Optimized for Surgery] : Patient optimized for surgery [No Further Testing Recommended] : no further testing recommended [Modify anticoagulant treatment prior to procedure] : Modify anticoagulant treatment prior to procedure [Modify anti-platelet treatment prior to procedure] : Modify anti-platelet treatment prior to procedure [Continue medications as is] : Continue current medications [As per surgery] : as per surgery [FreeTextEntry4] : Mr. GERALDINE CELESTIN is a 60 year old male who is medically optimized and cleared with acceptable risk for his planned procedure. \par  [FreeTextEntry5] : As per surgery [FreeTextEntry6] : "

## 2020-11-12 NOTE — HISTORY OF PRESENT ILLNESS
[No Pertinent Pulmonary History] : no history of asthma, COPD, sleep apnea, or smoking [No Adverse Anesthesia Reaction] : no adverse anesthesia reaction in self or family member [Diabetes] : diabetes [Spouse] : spouse [No Pertinent Cardiac History] : no history of aortic stenosis, atrial fibrillation, coronary artery disease, recent myocardial infarction, or implantable device/pacemaker [Implantable Device/Pacemaker] : no implantable device/pacemaker [FreeTextEntry1] : Possible lower anterior  colon resection with diverting loop ileostomy creation. [FreeTextEntry2] : 11/18/2020 [FreeTextEntry3] :  [FreeTextEntry4] : Mr. GERALDINE CELESTIN is a 60 year old male here today for preoperative clearance for possible colon resection. Done at the request of Dr Shah\par

## 2020-11-16 ENCOUNTER — RESULT REVIEW (OUTPATIENT)
Age: 60
End: 2020-11-16

## 2020-11-16 ENCOUNTER — APPOINTMENT (OUTPATIENT)
Dept: HEMATOLOGY ONCOLOGY | Facility: CLINIC | Age: 60
End: 2020-11-16
Payer: COMMERCIAL

## 2020-11-16 VITALS
TEMPERATURE: 98 F | RESPIRATION RATE: 18 BRPM | SYSTOLIC BLOOD PRESSURE: 139 MMHG | BODY MASS INDEX: 30.45 KG/M2 | DIASTOLIC BLOOD PRESSURE: 79 MMHG | WEIGHT: 194 LBS | OXYGEN SATURATION: 98 % | HEIGHT: 67 IN | HEART RATE: 74 BPM

## 2020-11-16 PROCEDURE — 99214 OFFICE O/P EST MOD 30 MIN: CPT

## 2020-11-17 ENCOUNTER — RESULT REVIEW (OUTPATIENT)
Age: 60
End: 2020-11-17

## 2020-11-17 NOTE — CONSULT LETTER
[FreeTextEntry3] : Tigist Kraus DO, FACVITALIY, FACP\par Medical Oncology and Hematology\par [facility] [place]\par

## 2020-11-17 NOTE — HISTORY OF PRESENT ILLNESS
[de-identified] : Patient seen and examined and here today for follow up\par Does not have any complaints today\par s/p flex sig. \par s/p MRI Pelvis [de-identified] : 60 year old male presents for hospital follow up of pulmonary embolus.  Patient was admitted to [place] 6/3/2020 and discharged 6/17/2020 with a diagnosis of COVID 19.  During admission patient found to have PE, discharged on Eliquis.

## 2020-11-17 NOTE — ASSESSMENT
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 Dennis\par 8/29/20 - CT chest - significant improvement in Ground glass interstitial peripheral infiltrates with minimal residual hazy granularity in the EMMANUEL and to lesser extent RUL. Findings suggest chronic vs interval residual or prior pneumonitis\par CT A/P - no suspicious mass or adenopathy within the A/P. \par CEA - 1.6\par Reviewed path - rectal polyp invasive adenocarcinoma arising in a tubulovillous adenoma, up to 1.5 cm in largest fragment. Moderately differentiated, invades into the submucosa, at the cauterized base of polyp.LVI not identified\par 10/9 - flex sig by Dr. Dennis\par Discussed at [facility] - mass at R heart border picked up by radiology on review of scans due to h/o PE. 8/2019 - CTA Heart - There is an extracardiac mass adjacent to the lateral aspect of the RA and to the lateral and anterior aspect of the SVC. There is no contrast uptake with in the mass. 5.2 x 1.7 x 6.1 cm cystic mass is noted contiguous with the R heart border.\par MRI A/P - 10/15/20 - high rectal tumor, T2, No visible mesorectal/superior rectal LN or Extramesorectal LN.\par -PET CT - small, intensely FDG avid lesion in lesion in the sigmoid colon, likely representing that primary tumor. No evidence of metastatic disease.\par - plan for resection next week\par \par #pericardial mass - persistent. \par 8/2019 - CTA Heart - There is an extracardiac mass adjacent to the lateral aspect of the RA and to the lateral and anterior aspect of the SVC. There is no contrast uptake with in the mass. 5.2 x 1.7 x 6.1 cm cystic mass is noted contiguous with the R heart border.\par PET CT - non avid right atrial appendage\par \par # bilateral PE\par due to COVID 19\par not on O2 any more\par 8/29/20 - CT chest - significant improvement in Ground glass interstitial peripheral infiltrates with minimal residual hazy granularity in the EMMANUEL and to lesser extent RUL. Findings suggest chronic vs interval residual or prior pneumonitis\par d-dimer negative - stop eliquis given anemia and rectal bleeding. Completed 3 months.\par follows with Dr. Sandy.\par \par #anemia\par bled significantly after CNY. hgb 13.7 -->9.3 -->11.7 -->14\par s/p iron\par \par #bladder wall thickening on CT\par UA neg for blood\par will monitor\par \par  \par RTC in 3 weeks to review path and see if adjuvant treatment is warranted

## 2020-11-18 ENCOUNTER — APPOINTMENT (OUTPATIENT)
Dept: SURGERY | Facility: HOSPITAL | Age: 60
End: 2020-11-18

## 2020-11-18 ENCOUNTER — RESULT REVIEW (OUTPATIENT)
Age: 60
End: 2020-11-18

## 2020-11-23 ENCOUNTER — NON-APPOINTMENT (OUTPATIENT)
Age: 60
End: 2020-11-23

## 2020-11-30 ENCOUNTER — RESULT REVIEW (OUTPATIENT)
Age: 60
End: 2020-11-30

## 2020-12-03 ENCOUNTER — APPOINTMENT (OUTPATIENT)
Dept: FAMILY MEDICINE | Facility: CLINIC | Age: 60
End: 2020-12-03
Payer: COMMERCIAL

## 2020-12-03 VITALS
SYSTOLIC BLOOD PRESSURE: 132 MMHG | OXYGEN SATURATION: 99 % | DIASTOLIC BLOOD PRESSURE: 78 MMHG | HEIGHT: 67 IN | WEIGHT: 177 LBS | BODY MASS INDEX: 27.78 KG/M2 | HEART RATE: 59 BPM | TEMPERATURE: 97.2 F

## 2020-12-03 DIAGNOSIS — K92.1 MELENA: ICD-10-CM

## 2020-12-03 PROCEDURE — 99213 OFFICE O/P EST LOW 20 MIN: CPT | Mod: 25

## 2020-12-03 PROCEDURE — 36415 COLL VENOUS BLD VENIPUNCTURE: CPT

## 2020-12-03 PROCEDURE — 99072 ADDL SUPL MATRL&STAF TM PHE: CPT

## 2020-12-03 RX ORDER — NEOMYCIN SULFATE 500 MG/1
500 TABLET ORAL
Qty: 6 | Refills: 0 | Status: DISCONTINUED | COMMUNITY
Start: 2020-11-03 | End: 2020-12-03

## 2020-12-03 RX ORDER — METRONIDAZOLE 500 MG/1
500 TABLET ORAL 3 TIMES DAILY
Qty: 21 | Refills: 0 | Status: DISCONTINUED | COMMUNITY
Start: 2020-11-03 | End: 2020-12-03

## 2020-12-03 NOTE — PHYSICAL EXAM
[Normal] : no respiratory distress, lungs were clear to auscultation bilaterally and no accessory muscle use [No Edema] : there was no peripheral edema [de-identified] : Flat affect

## 2020-12-03 NOTE — HISTORY OF PRESENT ILLNESS
[FreeTextEntry1] : Follow-up [de-identified] : Mr. GERALDINE CELESTIN is a 60 year old male here today for follow-up\par S/P partial colon resection for colon cancer- awaiting pathology. Will likely have colostomy bag for 3 months minimum.\par Hx of hyperuricemia- doesn't take allopurinol every day. \par \par

## 2020-12-03 NOTE — HEALTH RISK ASSESSMENT
[Yes] : Yes [Monthly or less (1 pt)] : Monthly or less (1 point) [1 or 2 (0 pts)] : 1 or 2 (0 points) [Never (0 pts)] : Never (0 points) [No] : In the past 12 months have you used drugs other than those required for medical reasons? No [No falls in past year] : Patient reported no falls in the past year [0] : 2) Feeling down, depressed, or hopeless: Not at all (0) [] : No [de-identified] : walking [de-identified] : soft foods and soup

## 2020-12-03 NOTE — REVIEW OF SYSTEMS
[Recent Change In Weight] : ~T recent weight change [Abdominal Pain] : abdominal pain [Suicidal] : suicidal [Negative] : Genitourinary [FreeTextEntry2] : Lost weight following surgery [FreeTextEntry7] : Around the colostomy site

## 2020-12-05 LAB
BASOPHILS # BLD AUTO: 0.05 K/UL
BASOPHILS NFR BLD AUTO: 0.6 %
EOSINOPHIL # BLD AUTO: 0.11 K/UL
EOSINOPHIL NFR BLD AUTO: 1.2 %
ESTIMATED AVERAGE GLUCOSE: 134 MG/DL
HBA1C MFR BLD HPLC: 6.3 %
HCT VFR BLD CALC: 36.1 %
HGB BLD-MCNC: 12.2 G/DL
IMM GRANULOCYTES NFR BLD AUTO: 1 %
LYMPHOCYTES # BLD AUTO: 2.39 K/UL
LYMPHOCYTES NFR BLD AUTO: 26.8 %
MAN DIFF?: NORMAL
MCHC RBC-ENTMCNC: 31.3 PG
MCHC RBC-ENTMCNC: 33.8 GM/DL
MCV RBC AUTO: 92.6 FL
MONOCYTES # BLD AUTO: 0.55 K/UL
MONOCYTES NFR BLD AUTO: 6.2 %
NEUTROPHILS # BLD AUTO: 5.74 K/UL
NEUTROPHILS NFR BLD AUTO: 64.2 %
PLATELET # BLD AUTO: 271 K/UL
RBC # BLD: 3.9 M/UL
RBC # FLD: 13.3 %
URATE SERPL-MCNC: 7.6 MG/DL
WBC # FLD AUTO: 8.93 K/UL

## 2020-12-08 ENCOUNTER — RESULT REVIEW (OUTPATIENT)
Age: 60
End: 2020-12-08

## 2020-12-08 ENCOUNTER — APPOINTMENT (OUTPATIENT)
Dept: SURGERY | Facility: CLINIC | Age: 60
End: 2020-12-08
Payer: COMMERCIAL

## 2020-12-08 ENCOUNTER — APPOINTMENT (OUTPATIENT)
Dept: HEMATOLOGY ONCOLOGY | Facility: CLINIC | Age: 60
End: 2020-12-08
Payer: COMMERCIAL

## 2020-12-08 VITALS
HEART RATE: 53 BPM | OXYGEN SATURATION: 100 % | BODY MASS INDEX: 27.78 KG/M2 | RESPIRATION RATE: 18 BRPM | HEIGHT: 67 IN | SYSTOLIC BLOOD PRESSURE: 142 MMHG | DIASTOLIC BLOOD PRESSURE: 84 MMHG | WEIGHT: 177 LBS

## 2020-12-08 VITALS
HEART RATE: 54 BPM | BODY MASS INDEX: 26.61 KG/M2 | WEIGHT: 177.6 LBS | SYSTOLIC BLOOD PRESSURE: 146 MMHG | OXYGEN SATURATION: 100 % | DIASTOLIC BLOOD PRESSURE: 84 MMHG | RESPIRATION RATE: 18 BRPM | TEMPERATURE: 95.1 F | HEIGHT: 68.4 IN

## 2020-12-08 PROCEDURE — 99072 ADDL SUPL MATRL&STAF TM PHE: CPT

## 2020-12-08 PROCEDURE — 99215 OFFICE O/P EST HI 40 MIN: CPT

## 2020-12-08 PROCEDURE — 99024 POSTOP FOLLOW-UP VISIT: CPT

## 2020-12-08 RX ORDER — PREDNISONE 10 MG/1
10 TABLET ORAL DAILY
Qty: 42 | Refills: 0 | Status: DISCONTINUED | COMMUNITY
Start: 2020-10-15 | End: 2020-12-04

## 2020-12-10 VITALS
HEIGHT: 68 IN | WEIGHT: 177 LBS | TEMPERATURE: 95.1 F | BODY MASS INDEX: 26.83 KG/M2 | OXYGEN SATURATION: 100 % | DIASTOLIC BLOOD PRESSURE: 84 MMHG | SYSTOLIC BLOOD PRESSURE: 146 MMHG | HEART RATE: 54 BPM | RESPIRATION RATE: 18 BRPM

## 2020-12-10 NOTE — ASSESSMENT
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 Dennis\par 8/29/20 - CT chest - significant improvement in Ground glass interstitial peripheral infiltrates with minimal residual hazy granularity in the EMMANUEL and to lesser extent RUL. Findings suggest chronic vs interval residual or prior pneumonitis\par CT A/P - no suspicious mass or adenopathy within the A/P. \par CEA - 1.6\par Reviewed path - rectal polyp invasive adenocarcinoma arising in a tubulovillous adenoma, up to 1.5 cm in largest fragment. Moderately differentiated, invades into the submucosa, at the cauterized base of polyp.LVI not identified\par 10/9 - flex sig by Dr. Dennis\par Discussed at Multidisciplinary TB - mass at R heart border picked up by radiology on review of scans due to h/o PE. 8/2019 - CTA Heart - There is an extracardiac mass adjacent to the lateral aspect of the RA and to the lateral and anterior aspect of the SVC. There is no contrast uptake with in the mass. 5.2 x 1.7 x 6.1 cm cystic mass is noted contiguous with the R heart border.\par MRI A/P - 10/15/20 - high rectal tumor, T2, No visible mesorectal/superior rectal LN or Extramesorectal LN.\par -PET CT - small, intensely FDG avid lesion in lesion in the sigmoid colon, likely representing that primary tumor. No evidence of metastatic disease.\par - s/p laparoscopic robotic LAR with diverticulectomy and loop ileostomy creation  pT2, N0 - stage I\par -Will continue to follow with Dr. Dennis for anastamosis\par -reviewed [facility] surveillence guidelines - Will need CNY at 1 year after surgery. No need for scans\par \par #pericardial mass - persistent. \par 8/2019 - CTA Heart - There is an extracardiac mass adjacent to the lateral aspect of the RA and to the lateral and anterior aspect of the SVC. There is no contrast uptake with in the mass. 5.2 x 1.7 x 6.1 cm cystic mass is noted contiguous with the R heart border.\par PET CT - non avid right atrial appendage\par \par # bilateral PE\par due to COVID 19\par not on O2 any more\par 8/29/20 - CT chest - significant improvement in Ground glass interstitial peripheral infiltrates with minimal residual hazy granularity in the EMMANUEL and to lesser extent RUL. Findings suggest chronic vs interval residual or prior pneumonitis\par d-dimer negative - stop eliquis given anemia and rectal bleeding. Completed 3 months.\par follows with Dr. Sandy.\par \par #anemia\par bled significantly after CNY. \par s/p iron\par hgb 13.8. monitor\par \par  \par RTC in 3 months with cbc with diff, cmp and CEA

## 2020-12-10 NOTE — CONSULT LETTER
[Dear  ___] : Dear  [unfilled], [Consult Letter:] : I had the pleasure of evaluating your patient, [unfilled]. [Please see my note below.] : Please see my note below. [Consult Closing:] : Thank you very much for allowing me to participate in the care of this patient.  If you have any questions, please do not hesitate to contact me. [Sincerely,] : Sincerely, [FreeTextEntry3] : Tigist Kraus DO, FACVITALIY, FACP\par Medical Oncology and Hematology\par [facility] [place]\par

## 2020-12-10 NOTE — HISTORY OF PRESENT ILLNESS
[de-identified] : Patient seen and examined and here today for follow up\par s/p laparoscopic robotic LAR with diverticulectomy and loop ileostomy creation\par Feeling well. Denies complaints. Having bowel movements.\par Eating well. \par Denies bleeding [de-identified] : 60 year old male presents for hospital follow up of pulmonary embolus.  Patient was admitted to [place] 6/3/2020 and discharged 6/17/2020 with a diagnosis of COVID 19.  During admission patient found to have PE, discharged on Eliquis.

## 2020-12-14 NOTE — PHYSICAL EXAM
[Abdomen Masses] : No abdominal masses [Abdomen Tenderness] : ~T No ~M abdominal tenderness [Exam Deferred] : exam was deferred [Respiratory Effort] : normal respiratory effort [Normal Rate and Rhythm] : normal rate and rhythm [de-identified] : no acute distress

## 2020-12-14 NOTE — HISTORY OF PRESENT ILLNESS
[FreeTextEntry1] : 60 M returns for follow up. \par \par Has completed staging work up including pelvic MRI, rigid proctosigmoidoscopy to assess height of tumor and potential transanl resection. \par \par Given height and size of tumor, aswell as likely T2 on MRI, will plan for robotic assisted low anterior resection with diverting loop ileostomy. \par \par patient otherwise feeling well. Will see [facility] prior to surgery for marking.

## 2020-12-14 NOTE — ASSESSMENT
[FreeTextEntry1] : 60 m with rectal cancer, likely T2 N0 based on imaging. \par \par Discussed risks and benefits of surgery, including infection, bleeding, anastomotic complications, open surgery. \par \par Will plan for robotic assisted lAR with diverting loop ileostomy. \par \par patient consented for surgery. \par Bowel prep instructions provided. \par Will coordinate with [facility] for ostomy marking. \par All questions answered.

## 2020-12-28 ENCOUNTER — APPOINTMENT (OUTPATIENT)
Dept: SURGERY | Facility: CLINIC | Age: 60
End: 2020-12-28
Payer: COMMERCIAL

## 2020-12-28 PROCEDURE — 45300 PROCTOSIGMOIDOSCOPY DX: CPT

## 2020-12-28 PROCEDURE — 99024 POSTOP FOLLOW-UP VISIT: CPT

## 2020-12-28 NOTE — HISTORY OF PRESENT ILLNESS
[FreeTextEntry1] : 60 M here for post-op follow up. Doing well. No issues with ileostomy, eating and drinking without issue, no pain, occasional sensation to have a bowel movement. No fevers, incisions have healed. \par \par Here to plan for ileostomy closure, will have rigid proctoscopy done today prior to barium enema to assess patency and intact anastomosis.

## 2020-12-28 NOTE — ASSESSMENT
[FreeTextEntry1] : 60 M s/p robotic low anterior resection with diverting loop ileostomy. Doing well, rigid procto today shows intact anastomosis. Plan for gastrografin enema to confirm intact anastomosis, if this is normal will go ahead with ileostomy closure. \par \par Discussed risks and benefits of ileostomy closure including wound healing, risk of bleeding, anastomotic leak. \par \par Patients questions answered. \par Will pick date for surgery after results of gastrografin enema.

## 2020-12-28 NOTE — PHYSICAL EXAM
[Abdomen Masses] : No abdominal masses [Abdomen Tenderness] : ~T No ~M abdominal tenderness [Normal rectal exam] : exam was normal [Excoriation] : no perianal excoriation [de-identified] : Ileostomy pink, functioning [de-identified] : No acute distress

## 2020-12-28 NOTE — PROCEDURE
[FreeTextEntry1] : Rigid proctoscopy performed, intact appearing anastomosis at approximately 8 cm. Tolerated procedure without issue.

## 2021-01-05 ENCOUNTER — RESULT REVIEW (OUTPATIENT)
Age: 61
End: 2021-01-05

## 2021-01-06 ENCOUNTER — RESULT REVIEW (OUTPATIENT)
Age: 61
End: 2021-01-06

## 2021-01-08 ENCOUNTER — RESULT REVIEW (OUTPATIENT)
Age: 61
End: 2021-01-08

## 2021-01-14 ENCOUNTER — APPOINTMENT (OUTPATIENT)
Dept: PULMONOLOGY | Facility: CLINIC | Age: 61
End: 2021-01-14
Payer: COMMERCIAL

## 2021-01-14 VITALS
WEIGHT: 174 LBS | HEART RATE: 70 BPM | HEIGHT: 68 IN | BODY MASS INDEX: 26.37 KG/M2 | DIASTOLIC BLOOD PRESSURE: 62 MMHG | TEMPERATURE: 97 F | OXYGEN SATURATION: 98 % | SYSTOLIC BLOOD PRESSURE: 118 MMHG

## 2021-01-14 DIAGNOSIS — J18.9 PNEUMONIA, UNSPECIFIED ORGANISM: ICD-10-CM

## 2021-01-14 PROCEDURE — 99215 OFFICE O/P EST HI 40 MIN: CPT

## 2021-01-14 PROCEDURE — 99072 ADDL SUPL MATRL&STAF TM PHE: CPT

## 2021-01-14 NOTE — HISTORY OF PRESENT ILLNESS
[TextBox_4] : 61 year old male with MARILUZ, PE s/p Covid infection for f/u.\par Last seen in Oct 2020 when he was started on Prednisone due to residual inflammation post Covid.\par Radiological studies since then seen: PET in Nov 2020: resolution of previous GGO, no uptake in the lungs.\par CXR in Nov normal\par ECHO: PA pressures 26, before PE PA pressures 23.\par Since the last visit he had colon CA resection with ileostomy.\par Dr Kraus note seen: AC was stopped due to GI bleed with anemia.\par He feels OK.\par Dyspnea has improved after Prednisone.\par Denies cough, hemoptysis, fever, chills\par He is using CPAP every night. No problems with the mask\par has lost weight since the Covid infection and he noticed that residual AHI on CPAP is 1 form 5 previously\par \par SHX: does not smoke

## 2021-01-14 NOTE — PHYSICAL EXAM
[No Acute Distress] : no acute distress [Normal Oropharynx] : normal oropharynx [Normal Appearance] : normal appearance [No Neck Mass] : no neck mass [Normal Rate/Rhythm] : normal rate/rhythm [Normal S1, S2] : normal s1, s2 [No Murmurs] : no murmurs [No Resp Distress] : no resp distress [No Acc Muscle Use] : no acc muscle use [Clear to Auscultation Bilaterally] : clear to auscultation bilaterally [No Abnormalities] : no abnormalities [Benign] : benign [Normal Gait] : normal gait [No Clubbing] : no clubbing [No Cyanosis] : no cyanosis [No Edema] : no edema [Normal Turgor] : normal turgor [No Focal Deficits] : no focal deficits [Oriented x3] : oriented x3 [Normal Affect] : normal affect [TextBox_89] : has colostomy bag

## 2021-01-14 NOTE — REVIEW OF SYSTEMS
[Recent Wt Loss (___ Lbs)] : ~T recent [unfilled] lb weight loss [Anemia] : anemia [Negative] : Endocrine [TextBox_30] : see HPI

## 2021-02-04 ENCOUNTER — APPOINTMENT (OUTPATIENT)
Dept: FAMILY MEDICINE | Facility: CLINIC | Age: 61
End: 2021-02-04
Payer: COMMERCIAL

## 2021-02-04 VITALS
OXYGEN SATURATION: 100 % | TEMPERATURE: 98.2 F | WEIGHT: 178 LBS | DIASTOLIC BLOOD PRESSURE: 70 MMHG | BODY MASS INDEX: 27.07 KG/M2 | HEART RATE: 64 BPM | SYSTOLIC BLOOD PRESSURE: 128 MMHG

## 2021-02-04 PROCEDURE — 99213 OFFICE O/P EST LOW 20 MIN: CPT

## 2021-02-04 PROCEDURE — 99072 ADDL SUPL MATRL&STAF TM PHE: CPT

## 2021-02-05 LAB
ABO + RH PNL BLD: NORMAL
ALBUMIN SERPL ELPH-MCNC: 5.1 G/DL
ALP BLD-CCNC: 60 U/L
ALT SERPL-CCNC: 30 U/L
ANION GAP SERPL CALC-SCNC: 14 MMOL/L
APTT BLD: 37 SEC
AST SERPL-CCNC: 31 U/L
BASOPHILS # BLD AUTO: 0.03 K/UL
BASOPHILS NFR BLD AUTO: 0.7 %
BILIRUB SERPL-MCNC: 0.6 MG/DL
BUN SERPL-MCNC: 20 MG/DL
CALCIUM SERPL-MCNC: 10.4 MG/DL
CHLORIDE SERPL-SCNC: 94 MMOL/L
CO2 SERPL-SCNC: 21 MMOL/L
CREAT SERPL-MCNC: 1.14 MG/DL
EOSINOPHIL # BLD AUTO: 0.08 K/UL
EOSINOPHIL NFR BLD AUTO: 2 %
GLUCOSE SERPL-MCNC: 110 MG/DL
HCT VFR BLD CALC: 40.3 %
HGB BLD-MCNC: 13.3 G/DL
IMM GRANULOCYTES NFR BLD AUTO: 0.2 %
INR PPP: 0.96 RATIO
LYMPHOCYTES # BLD AUTO: 1.11 K/UL
LYMPHOCYTES NFR BLD AUTO: 27.2 %
MAN DIFF?: NORMAL
MCHC RBC-ENTMCNC: 31 PG
MCHC RBC-ENTMCNC: 33 GM/DL
MCV RBC AUTO: 93.9 FL
MONOCYTES # BLD AUTO: 0.37 K/UL
MONOCYTES NFR BLD AUTO: 9.1 %
NEUTROPHILS # BLD AUTO: 2.48 K/UL
NEUTROPHILS NFR BLD AUTO: 60.8 %
PLATELET # BLD AUTO: 165 K/UL
POTASSIUM SERPL-SCNC: 4.7 MMOL/L
PROT SERPL-MCNC: 7.8 G/DL
PT BLD: 11.5 SEC
RBC # BLD: 4.29 M/UL
RBC # FLD: 13.3 %
SODIUM SERPL-SCNC: 129 MMOL/L
WBC # FLD AUTO: 4.08 K/UL

## 2021-02-05 NOTE — ASSESSMENT
[Patient Optimized for Surgery] : Patient optimized for surgery [Modify anti-platelet treatment prior to procedure] : Modify anti-platelet treatment prior to procedure [Continue medications as is] : Continue current medications [FreeTextEntry4] : Patient is medically optimized with acceptable risks for his planned surgery. He does have a slightly prolonged PTT and is mildly hyponatremic. [FreeTextEntry6] : Hold ASA for one week before.

## 2021-02-05 NOTE — RESULTS/DATA
[] : results reviewed [de-identified] : Nml [de-identified] : Nml PT; slightly prolonged PTT [de-identified] : Mild hyponatremia [de-identified] : Nml

## 2021-02-05 NOTE — HISTORY OF PRESENT ILLNESS
[No Pertinent Cardiac History] : no history of aortic stenosis, atrial fibrillation, coronary artery disease, recent myocardial infarction, or implantable device/pacemaker [No Pertinent Pulmonary History] : no history of asthma, COPD, sleep apnea, or smoking [No Adverse Anesthesia Reaction] : no adverse anesthesia reaction in self or family member [Diabetes] : diabetes [(Patient denies any chest pain, claudication, dyspnea on exertion, orthopnea, palpitations or syncope)] : Patient denies any chest pain, claudication, dyspnea on exertion, orthopnea, palpitations or syncope [Chronic Anticoagulation] : no chronic anticoagulation [Chronic Kidney Disease] : no chronic kidney disease [Anti-Platelet Agents: _____] : Anti-Platelet Agents: [unfilled] [FreeTextEntry1] : ileostomy closure [FreeTextEntry2] : 2/10/21 [FreeTextEntry3] : Dr Dennis [FreeTextEntry4] : Mr. GERALDINE CELESTIN is a 61 year old male here today for preoperative clearance. \par

## 2021-02-07 ENCOUNTER — RESULT REVIEW (OUTPATIENT)
Age: 61
End: 2021-02-07

## 2021-02-08 ENCOUNTER — RX RENEWAL (OUTPATIENT)
Age: 61
End: 2021-02-08

## 2021-02-10 ENCOUNTER — APPOINTMENT (OUTPATIENT)
Dept: SURGERY | Facility: HOSPITAL | Age: 61
End: 2021-02-10

## 2021-02-22 ENCOUNTER — NON-APPOINTMENT (OUTPATIENT)
Age: 61
End: 2021-02-22

## 2021-02-26 ENCOUNTER — APPOINTMENT (OUTPATIENT)
Dept: SURGERY | Facility: CLINIC | Age: 61
End: 2021-02-26
Payer: COMMERCIAL

## 2021-02-26 ENCOUNTER — APPOINTMENT (OUTPATIENT)
Dept: FAMILY MEDICINE | Facility: CLINIC | Age: 61
End: 2021-02-26

## 2021-02-26 VITALS
WEIGHT: 178 LBS | BODY MASS INDEX: 26.98 KG/M2 | DIASTOLIC BLOOD PRESSURE: 84 MMHG | HEART RATE: 82 BPM | SYSTOLIC BLOOD PRESSURE: 132 MMHG | HEIGHT: 68 IN | OXYGEN SATURATION: 98 %

## 2021-02-26 PROCEDURE — 99024 POSTOP FOLLOW-UP VISIT: CPT

## 2021-03-05 NOTE — HISTORY OF PRESENT ILLNESS
[FreeTextEntry1] : 61 M here for follow up after ileostomy closure. \par \par Procedure complicated by intra-abdominal bleeding requiring return to OR. Patient subsequently had an ileus that was slow to resolve. Started passing flatus and loose bowel movements and diet was advanced. \par \par Doing well at home continues to have bowel function. Abdomen still mildly distended but feels like it is improving. No nausea/vomiting. Bowel movements still loose but starting to improve. No fevers/chills. WOund healing well with daily dressing changes.

## 2021-03-05 NOTE — ASSESSMENT
[FreeTextEntry1] : 61 M here for post-op visit after ileostomy closure. \par \par DOing well, eating without issue, mildly distended, should continue to improve as continues to have bowel function. \par \par Will follow up in 2 weeks to reassess distention and wound. Should be healed by then. If distention has not improved would consider getting a CT scan to assess for any partial obstruction although patient is not currently symptomatic and says distention has been improving. \par \par Patient cleared to return to work after 3/5/21.

## 2021-03-05 NOTE — PHYSICAL EXAM
[Abdomen Masses] : No abdominal masses [Abdomen Tenderness] : ~T No ~M abdominal tenderness [Exam Deferred] : exam was deferred [No Rash or Lesion] : No rash or lesion [de-identified] : Mildly distended, wound clean, healing well.  [de-identified] : No acute distress

## 2021-03-09 ENCOUNTER — RESULT REVIEW (OUTPATIENT)
Age: 61
End: 2021-03-09

## 2021-03-09 ENCOUNTER — APPOINTMENT (OUTPATIENT)
Dept: HEMATOLOGY ONCOLOGY | Facility: CLINIC | Age: 61
End: 2021-03-09
Payer: COMMERCIAL

## 2021-03-09 VITALS
HEART RATE: 68 BPM | SYSTOLIC BLOOD PRESSURE: 120 MMHG | TEMPERATURE: 97.1 F | RESPIRATION RATE: 18 BRPM | HEIGHT: 69.6 IN | BODY MASS INDEX: 24.61 KG/M2 | OXYGEN SATURATION: 100 % | DIASTOLIC BLOOD PRESSURE: 74 MMHG | WEIGHT: 170 LBS

## 2021-03-09 DIAGNOSIS — R10.84 GENERALIZED ABDOMINAL PAIN: ICD-10-CM

## 2021-03-09 PROCEDURE — 99215 OFFICE O/P EST HI 40 MIN: CPT

## 2021-03-09 PROCEDURE — 99072 ADDL SUPL MATRL&STAF TM PHE: CPT

## 2021-03-09 NOTE — CONSULT LETTER
[Dear  ___] : Dear  [unfilled], [Consult Letter:] : I had the pleasure of evaluating your patient, [unfilled]. [Please see my note below.] : Please see my note below. [Consult Closing:] : Thank you very much for allowing me to participate in the care of this patient.  If you have any questions, please do not hesitate to contact me. [Sincerely,] : Sincerely, [FreeTextEntry3] : Tigist Kraus DO, FACVITALIY, FACP\par Medical Oncology and Hematology\par [facility] [place]\par

## 2021-03-09 NOTE — HISTORY OF PRESENT ILLNESS
[de-identified] : Patient seen and examined and here today for follow up\par s/p ileostomy closure. Required 2 units of prbcs during hospitalization\par Complains of some abd pain/ bloating\par Eating ok\par Having some constipation. Last BM this morning\par No N/V [de-identified] : 60 year old male presents for hospital follow up of pulmonary embolus.  Patient was admitted to [place] 6/3/2020 and discharged 6/17/2020 with a diagnosis of COVID 19.  During admission patient found to have PE, discharged on Eliquis.

## 2021-03-09 NOTE — ASSESSMENT
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 Dennis\par 8/29/20 - CT chest - significant improvement in Ground glass interstitial peripheral infiltrates with minimal residual hazy granularity in the EMMANUEL and to lesser extent RUL. Findings suggest chronic vs interval residual or prior pneumonitis\par CT A/P - no suspicious mass or adenopathy within the A/P. \par CEA - 1.6\par Reviewed path - rectal polyp invasive adenocarcinoma arising in a tubulovillous adenoma, up to 1.5 cm in largest fragment. Moderately differentiated, invades into the submucosa, at the cauterized base of polyp.LVI not identified\par 10/9 - flex sig by Dr. Dennis\par Discussed at [facility] - mass at R heart border picked up by radiology on review of scans due to h/o PE. 8/2019 - CTA Heart - There is an extracardiac mass adjacent to the lateral aspect of the RA and to the lateral and anterior aspect of the SVC. There is no contrast uptake with in the mass. 5.2 x 1.7 x 6.1 cm cystic mass is noted contiguous with the R heart border.\par MRI A/P - 10/15/20 - high rectal tumor, T2, No visible mesorectal/superior rectal LN or Extramesorectal LN.\par -PET CT - small, intensely FDG avid lesion in lesion in the sigmoid colon, likely representing that primary tumor. No evidence of metastatic disease.\par - s/p laparoscopic robotic LAR with diverticulectomy and loop ileostomy creation  pT2, N0 - stage I\par - s/p ileostomy closure\par -reviewed NCCN surveillence guidelines - Will need CNY at 1 year after surgery. No need for scans\par \par #pericardial mass - persistent. \par 8/2019 - CTA Heart - There is an extracardiac mass adjacent to the lateral aspect of the RA and to the lateral and anterior aspect of the SVC. There is no contrast uptake with in the mass. 5.2 x 1.7 x 6.1 cm cystic mass is noted contiguous with the R heart border.\par PET CT - non avid right atrial appendage\par \par # bilateral PE\par due to COVID 19\par not on O2 any more\par 8/29/20 - CT chest - significant improvement in Ground glass interstitial peripheral infiltrates with minimal residual hazy granularity in the EMMANUEL and to lesser extent RUL. Findings suggest chronic vs interval residual or prior pneumonitis\par d-dimer negative - stop eliquis given anemia and rectal bleeding. Completed 3 months.\par follows with Dr. Sandy.\par \par #anemia\par bled significantly after CNY. received iron with improvement of hgb\par hgb 11 s/p 2 units of prbcs during hospitalization for ileostomy closure.  \par On FERROUS SULFATE 325 MG PO daily. Continue for now\par Will check iron and ferritin\par  \par #abd pain/bloating\par advised to follow up with Dr. Dennis. He has an appt next week and wants to wait.\par \par RTC in 3 months with cbc with diff, cmp and CEA

## 2021-03-11 ENCOUNTER — APPOINTMENT (OUTPATIENT)
Dept: SURGERY | Facility: CLINIC | Age: 61
End: 2021-03-11
Payer: COMMERCIAL

## 2021-03-11 ENCOUNTER — RX RENEWAL (OUTPATIENT)
Age: 61
End: 2021-03-11

## 2021-03-11 VITALS
HEIGHT: 68.5 IN | HEART RATE: 76 BPM | SYSTOLIC BLOOD PRESSURE: 145 MMHG | WEIGHT: 170 LBS | DIASTOLIC BLOOD PRESSURE: 94 MMHG | BODY MASS INDEX: 25.47 KG/M2 | OXYGEN SATURATION: 99 % | RESPIRATION RATE: 16 BRPM

## 2021-03-11 PROCEDURE — 99024 POSTOP FOLLOW-UP VISIT: CPT

## 2021-03-15 ENCOUNTER — NON-APPOINTMENT (OUTPATIENT)
Age: 61
End: 2021-03-15

## 2021-03-19 NOTE — HISTORY OF PRESENT ILLNESS
[FreeTextEntry1] : 61 M here for second post-op visit. At initial visit had residual abdominal distention and still decreased appetite. Today doing better, eating well, continues to have improved bowel function, abdomen is flatter, no nausea/vomiting. Had a brief twinge of pain which prompted him to come in today instead of next week as originally scheduled. Now pain has gone away and he feels better. Ileostomy site has healed according to his wife he says.

## 2021-03-19 NOTE — PHYSICAL EXAM
[Abdomen Masses] : No abdominal masses [Abdomen Tenderness] : ~T No ~M abdominal tenderness [Respiratory Effort] : normal respiratory effort [No Rash or Lesion] : No rash or lesion [Alert] : alert [Calm] : calm [de-identified] : Improved distention, soft, ileostomy site healed, small residual scab.  [de-identified] : No acute distress

## 2021-03-19 NOTE — ASSESSMENT
[FreeTextEntry1] : 61 M with history of rectal cancer now s/p ileostomy closure. Continues to improve after surgery. \par \par Continue regular diet. \par Can return to normal activities. \par Had brief pain but currently asymptomatic, would not pursue further with imaging at this time as he has overall continue to improve with each passing week. \par Follow up as needed if pain returns or any other questions/concerns.

## 2021-04-07 ENCOUNTER — APPOINTMENT (OUTPATIENT)
Dept: PULMONOLOGY | Facility: CLINIC | Age: 61
End: 2021-04-07
Payer: COMMERCIAL

## 2021-04-07 VITALS
WEIGHT: 170 LBS | TEMPERATURE: 97 F | DIASTOLIC BLOOD PRESSURE: 60 MMHG | BODY MASS INDEX: 25.47 KG/M2 | HEART RATE: 61 BPM | SYSTOLIC BLOOD PRESSURE: 128 MMHG | OXYGEN SATURATION: 95 % | HEIGHT: 68.5 IN

## 2021-04-07 PROCEDURE — 99072 ADDL SUPL MATRL&STAF TM PHE: CPT

## 2021-04-07 PROCEDURE — 99213 OFFICE O/P EST LOW 20 MIN: CPT

## 2021-04-07 NOTE — PHYSICAL EXAM
[No Acute Distress] : no acute distress [Normal Appearance] : normal appearance [Normal Rate/Rhythm] : normal rate/rhythm [Normal S1, S2] : normal s1, s2 [No Resp Distress] : no resp distress [No Acc Muscle Use] : no acc muscle use [Clear to Auscultation Bilaterally] : clear to auscultation bilaterally [No Abnormalities] : no abnormalities [Normal Gait] : normal gait [No Clubbing] : no clubbing [No Cyanosis] : no cyanosis [Normal Turgor] : normal turgor [No Focal Deficits] : no focal deficits [Oriented x3] : oriented x3 [Normal Affect] : normal affect

## 2021-04-07 NOTE — REVIEW OF SYSTEMS
[Recent Wt Loss (___ Lbs)] : ~T recent [unfilled] lb weight loss [Dyspnea] : dyspnea [Constipation] : constipation [Anemia] : anemia [Blood Transfusion] : blood transfusion [Negative] : Endocrine

## 2021-04-07 NOTE — HISTORY OF PRESENT ILLNESS
[TextBox_4] : 61 year old male with MARILUZ on PAP, Hx of Covid infection and PE after the infection, Colon CA s/p resection, for f/u.\par Last seen in January 2021.\par Compliance report: \par Usage 67%\par Average use 4 hrs 47\par Leak 11.3\par AHI 1.4\par He did not use the PAP when he had the colostomy reversal SX.\par Does not feel that the pressure is too high. \par Sleeps well.\par Still with mild dyspnea after walking 0.8- 1 mile\par No cough, no hemoptysis.\par he lost 20-25 lbs since he had Covid infection\par

## 2021-04-08 ENCOUNTER — APPOINTMENT (OUTPATIENT)
Dept: SURGERY | Facility: CLINIC | Age: 61
End: 2021-04-08
Payer: COMMERCIAL

## 2021-04-08 VITALS
DIASTOLIC BLOOD PRESSURE: 73 MMHG | WEIGHT: 170 LBS | RESPIRATION RATE: 18 BRPM | OXYGEN SATURATION: 100 % | BODY MASS INDEX: 25.47 KG/M2 | SYSTOLIC BLOOD PRESSURE: 114 MMHG | HEART RATE: 53 BPM | HEIGHT: 68.5 IN

## 2021-04-08 PROCEDURE — 99024 POSTOP FOLLOW-UP VISIT: CPT

## 2021-04-08 NOTE — ASSESSMENT
[FreeTextEntry1] : 61 M here for follow up after ileostomy closure. \par \par Doing well, occasional constipation, recommend add fiber supplement, can continue colace as needed.  If symptoms worsen would consider rigid proctoscopy in office to reassass anastomosis for possible post-op stenosis but was widely patent prior to ileostomy closure. \par \par If remains minimally symptomatic will need colonoscopy at 1 year around [place]gust/September and will assess anastomosis at that time. \par \par Follow up in [place] for colonosocpy planning/scheduling.

## 2021-04-08 NOTE — PHYSICAL EXAM
[Abdomen Masses] : No abdominal masses [Abdomen Tenderness] : ~T No ~M abdominal tenderness [Exam Deferred] : exam was deferred [de-identified] : Incisions well healed, ileostomy site healed completely, no hernia, nontender,  [de-identified] : No acute distress

## 2021-04-08 NOTE — HISTORY OF PRESENT ILLNESS
[FreeTextEntry1] : 61  M here for post-op visit after ileostomy closure. Last seen in early march, had some bloating at the time which has improved. Occasional constipation with bowel movement after 2 days, not much straining, no blood per rectum. Using colace which is helping. Eating and drinkin well, occasional pain at ileostomy site, but not severe. \par \par Overall doing better.

## 2021-04-15 ENCOUNTER — RX RENEWAL (OUTPATIENT)
Age: 61
End: 2021-04-15

## 2021-04-25 ENCOUNTER — RX RENEWAL (OUTPATIENT)
Age: 61
End: 2021-04-25

## 2021-05-13 ENCOUNTER — APPOINTMENT (OUTPATIENT)
Dept: FAMILY MEDICINE | Facility: CLINIC | Age: 61
End: 2021-05-13
Payer: COMMERCIAL

## 2021-05-13 VITALS
OXYGEN SATURATION: 99 % | HEIGHT: 68.5 IN | WEIGHT: 174 LBS | HEART RATE: 61 BPM | DIASTOLIC BLOOD PRESSURE: 78 MMHG | RESPIRATION RATE: 15 BRPM | TEMPERATURE: 97.7 F | SYSTOLIC BLOOD PRESSURE: 126 MMHG | BODY MASS INDEX: 26.07 KG/M2

## 2021-05-13 DIAGNOSIS — E55.9 VITAMIN D DEFICIENCY, UNSPECIFIED: ICD-10-CM

## 2021-05-13 DIAGNOSIS — Z93.2 ILEOSTOMY STATUS: ICD-10-CM

## 2021-05-13 DIAGNOSIS — R14.0 ABDOMINAL DISTENSION (GASEOUS): ICD-10-CM

## 2021-05-13 DIAGNOSIS — J96.01 ACUTE RESPIRATORY FAILURE WITH HYPOXIA: ICD-10-CM

## 2021-05-13 PROCEDURE — 99214 OFFICE O/P EST MOD 30 MIN: CPT | Mod: 25

## 2021-05-13 PROCEDURE — 36415 COLL VENOUS BLD VENIPUNCTURE: CPT

## 2021-05-13 PROCEDURE — 99072 ADDL SUPL MATRL&STAF TM PHE: CPT

## 2021-05-13 PROCEDURE — 99396 PREV VISIT EST AGE 40-64: CPT | Mod: 25

## 2021-05-13 RX ORDER — PREDNISONE 20 MG/1
20 TABLET ORAL DAILY
Qty: 30 | Refills: 0 | Status: DISCONTINUED | COMMUNITY
Start: 2020-10-15 | End: 2021-05-13

## 2021-05-13 NOTE — HISTORY OF PRESENT ILLNESS
[FreeTextEntry1] : Pt here for PE [de-identified] : Pt here for PE\par Takes Metamucil and it helps with bowel movements.\par Ostomy was removed in February 2021.  Sees colorectal surgery regularly.\par Back at full time work 1199.\par Still using PAP machine at night.  Sees pulm prn.\par Generally feels well.\par \par

## 2021-05-17 LAB
ALBUMIN SERPL ELPH-MCNC: 4.6 G/DL
ALP BLD-CCNC: 49 U/L
ALT SERPL-CCNC: 15 U/L
ANION GAP SERPL CALC-SCNC: 12 MMOL/L
AST SERPL-CCNC: 25 U/L
BASOPHILS # BLD AUTO: 0.05 K/UL
BASOPHILS NFR BLD AUTO: 1.3 %
BILIRUB SERPL-MCNC: 0.5 MG/DL
BUN SERPL-MCNC: 16 MG/DL
CALCIUM SERPL-MCNC: 9.8 MG/DL
CHLORIDE SERPL-SCNC: 90 MMOL/L
CHOLEST SERPL-MCNC: 127 MG/DL
CK SERPL-CCNC: 337 U/L
CO2 SERPL-SCNC: 27 MMOL/L
CREAT SERPL-MCNC: 1.07 MG/DL
EOSINOPHIL # BLD AUTO: 0.1 K/UL
EOSINOPHIL NFR BLD AUTO: 2.6 %
ESTIMATED AVERAGE GLUCOSE: 120 MG/DL
FERRITIN SERPL-MCNC: 190 NG/ML
GLUCOSE SERPL-MCNC: 95 MG/DL
HBA1C MFR BLD HPLC: 5.8 %
HCT VFR BLD CALC: 43.5 %
HDLC SERPL-MCNC: 50 MG/DL
HGB BLD-MCNC: 13.6 G/DL
IMM GRANULOCYTES NFR BLD AUTO: 0.3 %
IRON SATN MFR SERPL: 40 %
IRON SERPL-MCNC: 100 UG/DL
LDLC SERPL CALC-MCNC: 66 MG/DL
LYMPHOCYTES # BLD AUTO: 1.67 K/UL
LYMPHOCYTES NFR BLD AUTO: 43.7 %
MAN DIFF?: NORMAL
MCHC RBC-ENTMCNC: 30.1 PG
MCHC RBC-ENTMCNC: 31.3 GM/DL
MCV RBC AUTO: 96.2 FL
MONOCYTES # BLD AUTO: 0.43 K/UL
MONOCYTES NFR BLD AUTO: 11.3 %
NEUTROPHILS # BLD AUTO: 1.56 K/UL
NEUTROPHILS NFR BLD AUTO: 40.8 %
NONHDLC SERPL-MCNC: 77 MG/DL
PLATELET # BLD AUTO: 118 K/UL
POTASSIUM SERPL-SCNC: 4.9 MMOL/L
PROT SERPL-MCNC: 7.2 G/DL
PSA SERPL-MCNC: 0.74 NG/ML
RBC # BLD: 4.52 M/UL
RBC # FLD: 14 %
SODIUM SERPL-SCNC: 129 MMOL/L
TIBC SERPL-MCNC: 250 UG/DL
TRIGL SERPL-MCNC: 52 MG/DL
UIBC SERPL-MCNC: 150 UG/DL
URATE SERPL-MCNC: 7.4 MG/DL
VALPROATE SERPL-MCNC: 90 UG/ML
WBC # FLD AUTO: 3.82 K/UL

## 2021-06-30 ENCOUNTER — APPOINTMENT (OUTPATIENT)
Dept: HEMATOLOGY ONCOLOGY | Facility: CLINIC | Age: 61
End: 2021-06-30
Payer: COMMERCIAL

## 2021-06-30 ENCOUNTER — RESULT REVIEW (OUTPATIENT)
Age: 61
End: 2021-06-30

## 2021-06-30 VITALS
HEART RATE: 53 BPM | HEIGHT: 68.5 IN | DIASTOLIC BLOOD PRESSURE: 76 MMHG | SYSTOLIC BLOOD PRESSURE: 130 MMHG | OXYGEN SATURATION: 100 % | BODY MASS INDEX: 26.97 KG/M2 | WEIGHT: 180 LBS | TEMPERATURE: 98.7 F | RESPIRATION RATE: 16 BRPM

## 2021-06-30 PROCEDURE — 99072 ADDL SUPL MATRL&STAF TM PHE: CPT

## 2021-06-30 PROCEDURE — 99214 OFFICE O/P EST MOD 30 MIN: CPT

## 2021-06-30 NOTE — CONSULT LETTER
[Dear  ___] : Dear  [unfilled], [Consult Letter:] : I had the pleasure of evaluating your patient, [unfilled]. [Please see my note below.] : Please see my note below. [Consult Closing:] : Thank you very much for allowing me to participate in the care of this patient.  If you have any questions, please do not hesitate to contact me. [Sincerely,] : Sincerely, [FreeTextEntry3] : Tigist Kraus DO, FACVITALIY, FACP\par Medical Oncology and Hematology\par [facility] [place]\par

## 2021-06-30 NOTE — ASSESSMENT
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 Dennis\par 8/29/20 - CT chest - significant improvement in Ground glass interstitial peripheral infiltrates with minimal residual hazy granularity in the EMMANUEL and to lesser extent RUL. Findings suggest chronic vs interval residual or prior pneumonitis\par CT A/P - no suspicious mass or adenopathy within the A/P. \par CEA - 1.6\par Reviewed path - rectal polyp invasive adenocarcinoma arising in a tubulovillous adenoma, up to 1.5 cm in largest fragment. Moderately differentiated, invades into the submucosa, at the cauterized base of polyp.LVI not identified\par 10/9 - flex sig by Dr. Dennis\par Discussed at Multidisciplinary TB - mass at R heart border picked up by radiology on review of scans due to h/o PE. 8/2019 - CTA Heart - There is an extracardiac mass adjacent to the lateral aspect of the RA and to the lateral and anterior aspect of the SVC. There is no contrast uptake with in the mass. 5.2 x 1.7 x 6.1 cm cystic mass is noted contiguous with the R heart border.\par MRI A/P - 10/15/20 - high rectal tumor, T2, No visible mesorectal/superior rectal LN or Extramesorectal LN.\par -PET CT - small, intensely FDG avid lesion in lesion in the sigmoid colon, likely representing that primary tumor. No evidence of metastatic disease.\par - s/p laparoscopic robotic LAR with diverticulectomy and loop ileostomy creation  pT2, N0 - stage I\par - s/p ileostomy closure\par -reviewed NCCN surveillence guidelines - CNY scheduled for august with Dr. Dennis. No need for scans\par \par #pericardial mass - persistent. \par 8/2019 - CTA Heart - There is an extracardiac mass adjacent to the lateral aspect of the RA and to the lateral and anterior aspect of the SVC. There is no contrast uptake with in the mass. 5.2 x 1.7 x 6.1 cm cystic mass is noted contiguous with the R heart border.\par PET CT - non avid right atrial appendage\par \par # bilateral PE\par due to COVID 19\par not on O2 any more\par 8/29/20 - CT chest - significant improvement in Ground glass interstitial peripheral infiltrates with minimal residual hazy granularity in the EMMANUEL and to lesser extent RUL. Findings suggest chronic vs interval residual or prior pneumonitis\par follows with Dr. Sandy.\par no on AC any more\par \par #anemia\par bled significantly after CNY. received iron with improvement of hgb\par hgb 11 s/p 2 units of prbcs during hospitalization for ileostomy closure.  \par On FERROUS SULFATE 325 MG PO every other day. He is having constipation so told he can hold iron if needed\par ferritin in may was 190. \par \par # thrombocytopenia\par monitor - plts 119 k\par may be due to meds vs ITP\par \par RTC in 3 months with cbc with diff, cmp and CEA

## 2021-06-30 NOTE — HISTORY OF PRESENT ILLNESS
[de-identified] : Patient seen and examined and here today for follow up\par Complains of constipation but this is improving\par breathing is ok [de-identified] : 60 year old male presents for hospital follow up of pulmonary embolus.  Patient was admitted to [place] 6/3/2020 and discharged 6/17/2020 with a diagnosis of COVID 19.  During admission patient found to have PE, discharged on Eliquis.

## 2021-07-13 ENCOUNTER — APPOINTMENT (OUTPATIENT)
Dept: SURGERY | Facility: CLINIC | Age: 61
End: 2021-07-13
Payer: COMMERCIAL

## 2021-07-13 VITALS
BODY MASS INDEX: 26.97 KG/M2 | WEIGHT: 180 LBS | HEIGHT: 68.5 IN | OXYGEN SATURATION: 100 % | DIASTOLIC BLOOD PRESSURE: 71 MMHG | RESPIRATION RATE: 16 BRPM | HEART RATE: 55 BPM | SYSTOLIC BLOOD PRESSURE: 116 MMHG

## 2021-07-13 PROCEDURE — 99212 OFFICE O/P EST SF 10 MIN: CPT

## 2021-07-15 ENCOUNTER — RX RENEWAL (OUTPATIENT)
Age: 61
End: 2021-07-15

## 2021-08-11 NOTE — ASSESSMENT
[FreeTextEntry1] : 61 M here with history of rectal cancer s/p Robotic LAR and subsequent ileostomy closure. \par \par Plan for colonoscopy on September 10th for 1 year follow up. \par \par Risks and benefits discussed including pain, bloating, perforation, incomplete/poor exam due to stool. \par \par BOwel prep instructions provided.

## 2021-08-11 NOTE — PHYSICAL EXAM
[Abdomen Masses] : No abdominal masses [Abdomen Tenderness] : ~T No ~M abdominal tenderness [JVD] : no jugular venous distention  [Respiratory Effort] : normal respiratory effort [Normal Rate and Rhythm] : normal rate and rhythm [de-identified] : Well healed scars [de-identified] : No acute distress

## 2021-08-11 NOTE — HISTORY OF PRESENT ILLNESS
[FreeTextEntry1] : 61  M here for follow up visit and discuss 1 year colonoscopy. \par \par Patient generally doing well, moving bowels regularly, some constipation symptoms. Ileostomy site healed completely. Occasional bloating/swelling abdomen. \par \par Back at work doing well.

## 2021-09-08 ENCOUNTER — RESULT REVIEW (OUTPATIENT)
Age: 61
End: 2021-09-08

## 2021-09-09 ENCOUNTER — RESULT REVIEW (OUTPATIENT)
Age: 61
End: 2021-09-09

## 2021-09-10 ENCOUNTER — APPOINTMENT (OUTPATIENT)
Dept: SURGERY | Facility: HOSPITAL | Age: 61
End: 2021-09-10

## 2021-10-06 ENCOUNTER — RESULT REVIEW (OUTPATIENT)
Age: 61
End: 2021-10-06

## 2021-10-06 ENCOUNTER — APPOINTMENT (OUTPATIENT)
Dept: HEMATOLOGY ONCOLOGY | Facility: CLINIC | Age: 61
End: 2021-10-06
Payer: COMMERCIAL

## 2021-10-06 VITALS
HEIGHT: 68.5 IN | HEART RATE: 61 BPM | TEMPERATURE: 97.1 F | SYSTOLIC BLOOD PRESSURE: 129 MMHG | RESPIRATION RATE: 16 BRPM | WEIGHT: 188.13 LBS | OXYGEN SATURATION: 99 % | BODY MASS INDEX: 28.18 KG/M2 | DIASTOLIC BLOOD PRESSURE: 80 MMHG

## 2021-10-06 VITALS
OXYGEN SATURATION: 99 % | WEIGHT: 188.13 LBS | TEMPERATURE: 97.1 F | HEIGHT: 68.5 IN | DIASTOLIC BLOOD PRESSURE: 80 MMHG | RESPIRATION RATE: 16 BRPM | BODY MASS INDEX: 28.18 KG/M2 | HEART RATE: 61 BPM | SYSTOLIC BLOOD PRESSURE: 129 MMHG

## 2021-10-06 PROBLEM — U07.1 PNEUMONIA DUE TO COVID-19 VIRUS: Status: ACTIVE | Noted: 2020-06-23

## 2021-10-06 PROCEDURE — 99214 OFFICE O/P EST MOD 30 MIN: CPT

## 2021-10-06 NOTE — HISTORY OF PRESENT ILLNESS
[de-identified] : Patient seen and examined and here today for follow up\par Complains of constipation \par No rectabl bleeding\par Had CNY - tubular adenoma found [de-identified] : 60 year old male presents for hospital follow up of pulmonary embolus.  Patient was admitted to [facility] 6/3/2020 and discharged 6/17/2020 with a diagnosis of COVID 19.  During admission patient found to have PE, discharged on Eliquis.

## 2021-10-06 NOTE — ASSESSMENT
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 Dennis\par 8/29/20 - CT chest - significant improvement in Ground glass interstitial peripheral infiltrates with minimal residual hazy granularity in the EMMANUEL and to lesser extent RUL. Findings suggest chronic vs interval residual or prior pneumonitis\par CT A/P - no suspicious mass or adenopathy within the A/P. \par CEA - 1.6\par Reviewed path - rectal polyp invasive adenocarcinoma arising in a tubulovillous adenoma, up to 1.5 cm in largest fragment. Moderately differentiated, invades into the submucosa, at the cauterized base of polyp.LVI not identified\par 10/9 - flex sig by Dr. Dennis\par Discussed at Multidisciplinary TB - mass at R heart border picked up by radiology on review of scans due to h/o PE. 8/2019 - CTA Heart - There is an extracardiac mass adjacent to the lateral aspect of the RA and to the lateral and anterior aspect of the SVC. There is no contrast uptake with in the mass. 5.2 x 1.7 x 6.1 cm cystic mass is noted contiguous with the R heart border.\par MRI A/P - 10/15/20 - high rectal tumor, T2, No visible mesorectal/superior rectal LN or Extramesorectal LN.\par -PET CT - small, intensely FDG avid lesion in lesion in the sigmoid colon, likely representing that primary tumor. No evidence of metastatic disease.\par - s/p laparoscopic robotic LAR with diverticulectomy and loop ileostomy creation  pT2, N0 - stage I\par - s/p ileostomy closure\par -10/6/ 2021 -reviewed NCCN surveillence guidelines - CNY 9/2021 -tubular adenoma - will receive another CNY in 1 year. No need for scans. CEA pending. \par \par #pericardial mass - persistent. \par 8/2019 - CTA Heart - There is an extracardiac mass adjacent to the lateral aspect of the RA and to the lateral and anterior aspect of the SVC. There is no contrast uptake with in the mass. 5.2 x 1.7 x 6.1 cm cystic mass is noted contiguous with the R heart border.\par PET CT - non avid right atrial appendage\par \par # bilateral PE\par due to COVID 19\par not on O2 any more\par 8/29/20 - CT chest - significant improvement in Ground glass interstitial peripheral infiltrates with minimal residual hazy granularity in the EMMANUEL and to lesser extent RUL. Findings suggest chronic vs interval residual or prior pneumonitis\par follows with Dr. Sandy.\par no on AC any more\par \par #anemia\par bled significantly after CNY. received iron with improvement of hgb\par 10/6/2021 - vs and labs reviewed. hgb 12 . pending ferritin - if low will over IV iron as he had constipation from oral iron\par \par # thrombocytopenia\par monitor - plts 127 k\par may be due to meds vs ITP\par \par RTC in 3 months with cbc with diff, cmp and CEA

## 2021-10-06 NOTE — CONSULT LETTER
[Dear  ___] : Dear  [unfilled], [Consult Letter:] : I had the pleasure of evaluating your patient, [unfilled]. [Please see my note below.] : Please see my note below. [Consult Closing:] : Thank you very much for allowing me to participate in the care of this patient.  If you have any questions, please do not hesitate to contact me. [Sincerely,] : Sincerely, [FreeTextEntry3] : Tigist Kraus DO, FACVITALIY, FACP\par Medical Oncology and Hematology\par [facility] [place]\par

## 2021-10-27 ENCOUNTER — RX RENEWAL (OUTPATIENT)
Age: 61
End: 2021-10-27

## 2021-12-06 ENCOUNTER — APPOINTMENT (OUTPATIENT)
Dept: FAMILY MEDICINE | Facility: CLINIC | Age: 61
End: 2021-12-06
Payer: COMMERCIAL

## 2021-12-06 ENCOUNTER — MED ADMIN CHARGE (OUTPATIENT)
Age: 61
End: 2021-12-06

## 2021-12-06 VITALS
TEMPERATURE: 97.7 F | DIASTOLIC BLOOD PRESSURE: 78 MMHG | HEIGHT: 68.5 IN | RESPIRATION RATE: 17 BRPM | SYSTOLIC BLOOD PRESSURE: 140 MMHG | WEIGHT: 188 LBS | HEART RATE: 60 BPM | OXYGEN SATURATION: 99 % | BODY MASS INDEX: 28.17 KG/M2

## 2021-12-06 DIAGNOSIS — E83.52 HYPERCALCEMIA: ICD-10-CM

## 2021-12-06 DIAGNOSIS — K43.9 VENTRAL HERNIA W/OUT OBSTRUCTION OR GANGRENE: ICD-10-CM

## 2021-12-06 PROCEDURE — G0008: CPT

## 2021-12-06 PROCEDURE — 90686 IIV4 VACC NO PRSV 0.5 ML IM: CPT

## 2021-12-06 PROCEDURE — 99214 OFFICE O/P EST MOD 30 MIN: CPT | Mod: 25

## 2021-12-06 PROCEDURE — 36415 COLL VENOUS BLD VENIPUNCTURE: CPT

## 2021-12-06 NOTE — PHYSICAL EXAM
[Normal] : affect was normal and insight and judgment were intact [de-identified] : Right ventral hernia at site of surgery

## 2021-12-06 NOTE — HISTORY OF PRESENT ILLNESS
[FreeTextEntry1] : Pt here for f/u. [de-identified] : Pt here for f/u.\par Thinks sugar might be high.  Eats more rice than he feels that he should.  \par Constipation.  Feels like he needs to take sennakot to have a stool.  Since the surgery.  Took metamucil and it helped a little.  Goes about three days without stools.  More often taking dulcolax.  Had used miralax for his colonoscopy and that helped.\par Worried about uric acid level.  Takes allopurinol but about 1-2 times a week.  Stopped it entirely a month ago.  Makes him urinate a lot and needs to drink a lot of water.\par Had colonoscopy 9/10/2021.  Non cancerous polyp.  Told to repeat in 2 years but patient prefers one year.\par Rectal cancer treated.  Didn't have to get radiation treatment or chemotherapy.\par Got distended stomach after ileostomy bag was removed.  He reports that its some muscle weakness.\par Still sees psych.  On depakote.  needs valproic acid levels drawn.\par Pt has been using his CPAP machine.  Credits ihat with improved BP readings.\par \par

## 2021-12-06 NOTE — HEALTH RISK ASSESSMENT
[No] : No [No falls in past year] : Patient reported no falls in the past year [0] : 2) Feeling down, depressed, or hopeless: Not at all (0) [] : No [[id number]] : 0

## 2021-12-14 LAB
ALBUMIN SERPL ELPH-MCNC: 4.4 G/DL
ALP BLD-CCNC: 45 U/L
ALT SERPL-CCNC: 14 U/L
ANION GAP SERPL CALC-SCNC: 11 MMOL/L
AST SERPL-CCNC: 28 U/L
BILIRUB SERPL-MCNC: 0.4 MG/DL
BUN SERPL-MCNC: 17 MG/DL
CALCIUM SERPL-MCNC: 9.5 MG/DL
CHLORIDE SERPL-SCNC: 95 MMOL/L
CK SERPL-CCNC: 368 U/L
CO2 SERPL-SCNC: 24 MMOL/L
CREAT SERPL-MCNC: 1.12 MG/DL
ESTIMATED AVERAGE GLUCOSE: 128 MG/DL
GLUCOSE SERPL-MCNC: 108 MG/DL
HBA1C MFR BLD HPLC: 6.1 %
POTASSIUM SERPL-SCNC: 4.7 MMOL/L
PROT SERPL-MCNC: 6.8 G/DL
SODIUM SERPL-SCNC: 131 MMOL/L
URATE SERPL-MCNC: 8.3 MG/DL
VALPROATE SERPL-MCNC: 95 UG/ML

## 2021-12-23 ENCOUNTER — RX RENEWAL (OUTPATIENT)
Age: 61
End: 2021-12-23

## 2022-01-04 ENCOUNTER — RESULT REVIEW (OUTPATIENT)
Age: 62
End: 2022-01-04

## 2022-01-04 ENCOUNTER — APPOINTMENT (OUTPATIENT)
Dept: HEMATOLOGY ONCOLOGY | Facility: CLINIC | Age: 62
End: 2022-01-04

## 2022-01-04 VITALS
SYSTOLIC BLOOD PRESSURE: 114 MMHG | HEART RATE: 60 BPM | WEIGHT: 185 LBS | OXYGEN SATURATION: 98 % | BODY MASS INDEX: 27.72 KG/M2 | DIASTOLIC BLOOD PRESSURE: 63 MMHG | HEIGHT: 68.5 IN | TEMPERATURE: 98.5 F | RESPIRATION RATE: 18 BRPM

## 2022-01-11 ENCOUNTER — APPOINTMENT (OUTPATIENT)
Dept: HEMATOLOGY ONCOLOGY | Facility: CLINIC | Age: 62
End: 2022-01-11
Payer: COMMERCIAL

## 2022-01-11 PROCEDURE — 99213 OFFICE O/P EST LOW 20 MIN: CPT | Mod: 25,95

## 2022-01-11 NOTE — CONSULT LETTER
[Dear  ___] : Dear  [unfilled], [Consult Letter:] : I had the pleasure of evaluating your patient, [unfilled]. [Please see my note below.] : Please see my note below. [Consult Closing:] : Thank you very much for allowing me to participate in the care of this patient.  If you have any questions, please do not hesitate to contact me. [Sincerely,] : Sincerely, [FreeTextEntry3] : Tigist Kraus DO, FACVITALIY, FACP\par Medical Oncology and Hematology\par [facility] [place]\par

## 2022-01-11 NOTE — HISTORY OF PRESENT ILLNESS
[Home] : at home, [unfilled] , at the time of the visit. [Medical Office: ([facility])___] : at the medical office located in  [Verbal consent obtained from patient] : the patient, [unfilled] [de-identified] : Patient seen and examined and here today for follow up\par Complains of on and off constipation - continues with stool softener\par No rectal bleeding\par remains on oral iron [de-identified] : 60 year old male presents for hospital follow up of pulmonary embolus.  Patient was admitted to [place] 6/3/2020 and discharged 6/17/2020 with a diagnosis of COVID 19.  During admission patient found to have PE, discharged on Eliquis.

## 2022-01-11 NOTE — ASSESSMENT
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 Dennis\par 8/29/20 - CT chest - significant improvement in Ground glass interstitial peripheral infiltrates with minimal residual hazy granularity in the EMMANUEL and to lesser extent RUL. Findings suggest chronic vs interval residual or prior pneumonitis\par CT A/P - no suspicious mass or adenopathy within the A/P. \par CEA - 1.6\par Reviewed path - rectal polyp invasive adenocarcinoma arising in a tubulovillous adenoma, up to 1.5 cm in largest fragment. Moderately differentiated, invades into the submucosa, at the cauterized base of polyp.LVI not identified\par 10/9 - flex sig by Dr. Dennis\par Discussed at Multidisciplinary TB - mass at R heart border picked up by radiology on review of scans due to h/o PE. 8/2019 - CTA Heart - There is an extracardiac mass adjacent to the lateral aspect of the RA and to the lateral and anterior aspect of the SVC. There is no contrast uptake with in the mass. 5.2 x 1.7 x 6.1 cm cystic mass is noted contiguous with the R heart border.\par MRI A/P - 10/15/20 - high rectal tumor, T2, No visible mesorectal/superior rectal LN or Extramesorectal LN.\par -PET CT - small, intensely FDG avid lesion in lesion in the sigmoid colon, likely representing that primary tumor. No evidence of metastatic disease.\par - s/p laparoscopic robotic LAR with diverticulectomy and loop ileostomy creation  pT2, N0 - stage I\par - s/p ileostomy closure\par -10/6/ 2021 -reviewed NCCN surveillence guidelines - CNY 9/2021 -tubular adenoma - will receive another CNY in 1 year. No need for scans. CEA pending. \par 1/11/2022- vs and labs reviewed. CEA 0.6 wnl. needs CNY in 9/2022. hgb 12.3, plts 138\par \par #pericardial mass - persistent. \par 8/2019 - CTA Heart - There is an extracardiac mass adjacent to the lateral aspect of the RA and to the lateral and anterior aspect of the SVC. There is no contrast uptake with in the mass. 5.2 x 1.7 x 6.1 cm cystic mass is noted contiguous with the R heart border.\par PET CT - non avid right atrial appendage\par \par # bilateral PE\par due to COVID 19\par not on O2 any more\par 8/29/20 - CT chest - significant improvement in Ground glass interstitial peripheral infiltrates with minimal residual hazy granularity in the EMMANUEL and to lesser extent RUL. Findings suggest chronic vs interval residual or prior pneumonitis\par follows with Dr. Sandy.\par no on AC any more\par \par #anemia\par bled significantly after CNY. received iron with improvement of hgb\par 10/6/2021 - vs and labs reviewed. hgb 12 . pending ferritin - if low will over IV iron as he had constipation from oral iron\par 1/11/22 - vs and labs reviewed. hgb 12.3\par \par # thrombocytopenia\par monitor - plts 138 k\par may be due to meds vs ITP\par \par RTC in 3 months with cbc with diff, cmp and CEA

## 2022-01-25 ENCOUNTER — RX RENEWAL (OUTPATIENT)
Age: 62
End: 2022-01-25

## 2022-02-16 RX ORDER — ATORVASTATIN CALCIUM 80 MG/1
80 TABLET, FILM COATED ORAL
Qty: 90 | Refills: 3 | Status: DISCONTINUED | COMMUNITY
Start: 2022-01-25 | End: 2022-02-16

## 2022-02-23 ENCOUNTER — APPOINTMENT (OUTPATIENT)
Dept: PULMONOLOGY | Facility: CLINIC | Age: 62
End: 2022-02-23
Payer: COMMERCIAL

## 2022-02-23 VITALS
HEIGHT: 68.5 IN | WEIGHT: 185 LBS | BODY MASS INDEX: 27.72 KG/M2 | TEMPERATURE: 98.4 F | DIASTOLIC BLOOD PRESSURE: 67 MMHG | OXYGEN SATURATION: 99 % | SYSTOLIC BLOOD PRESSURE: 126 MMHG | HEART RATE: 61 BPM

## 2022-02-23 PROCEDURE — 99213 OFFICE O/P EST LOW 20 MIN: CPT

## 2022-02-23 NOTE — HISTORY OF PRESENT ILLNESS
[TextBox_4] : 62 year old male with MARILUZ, Hx of PE after a Covid infection, colon CA s/p resection came for f/u.\par Last seen in 2021\par No new CXR or CT chest since the last admission\par Compliance report:\par 2022 - 2022\par Patient ID: [id number]\par : 1960\par Age: 62 years\[place]\par Compliance Report\par Usage 2022 - 2022\par Usage days 29/30 days (97%)\par >= 4 hours 29 days (97%)\par < 4 hours 0 days (0%)\par Usage hours 224 hours 14 minutes\par Average usage (total days) 7 hours 28 minutes\par Average usage (days used) 7 hours 44 minutes\par Median usage (days used) 7 hours 52 minutes\par Total used hours (value since last reset - 2022) 5,035 hours\par AirSense 10 AutoSet\par Serial number [id number]\par Mode CPAP\par Set pressure 13 cmH2O\par EPR Fulltime\par EPR level 3\par Therapy\par Leaks - L/min Median: 5.4 95th percentile: 18.0 Maximum: 25.9\par Events per hour AI: 0.4 HI: 0.1 AHI: 0.5\par Apnea Index Central: 0.0 Obstructive: 0.3 Unknown: 0.0\par RERA Index 0.2\par Cheyne-Ricci respiration (average duration per night) 0 minutes (0%)\par \par He feels OK. Uses CPAP without any problems. Sleeps well with PAP. Mask is a good fit.\par Did not have any new respiratory complaints.\par Denies cough, CP, hemoptysis\par No hospital admissions.\par No other Covid infection\par \par \par

## 2022-03-15 ENCOUNTER — APPOINTMENT (OUTPATIENT)
Dept: SURGERY | Facility: CLINIC | Age: 62
End: 2022-03-15
Payer: COMMERCIAL

## 2022-03-15 VITALS
HEART RATE: 57 BPM | RESPIRATION RATE: 18 BRPM | BODY MASS INDEX: 28.08 KG/M2 | HEIGHT: 68.5 IN | SYSTOLIC BLOOD PRESSURE: 133 MMHG | WEIGHT: 187.39 LBS | DIASTOLIC BLOOD PRESSURE: 81 MMHG | OXYGEN SATURATION: 100 %

## 2022-03-15 PROCEDURE — 99213 OFFICE O/P EST LOW 20 MIN: CPT

## 2022-03-15 NOTE — PHYSICAL EXAM
[Abdomen Masses] : No abdominal masses [Respiratory Effort] : normal respiratory effort [No Rash or Lesion] : No rash or lesion [Alert] : alert [Calm] : calm [de-identified] : Splinting of right rectus muscle and thinning of the fascia.  Likely incisional hernia at the ileostomy site. [de-identified] : No acute distress

## 2022-03-15 NOTE — ASSESSMENT
[FreeTextEntry1] : 62-year-old male status post robotic assisted low anterior resection with diverting loop ileostomy and subsequent ileostomy closure.  Patient overall doing well.  Has noted a bulge in his prior ileostomy site.  Physical exam likely consistent with incisional hernia.  Reviewing his prior abdominal CT scans the patient also had a midline diastases and small umbilical hernia defect.\par \par Discussed with the patient that the hernia does not get better without surgery.  Would like to get a CT abdomen pelvis to further assess the defect as well as his prior midline diastases and umbilical hernia to better plan surgical approach.  Discussed operative approaches including open laparoscopic and robotic.  Would likely proceed with robotic assisted incisional hernia repair to allow for wide placement of mesh to reinforce repair as well as midline diastases and umbilical hernia.\par \par Patient believes he would like to proceed with surgery in June likely return for follow-up in May.  In the meantime he can get a CT abdomen pelvis to further define the anatomy prior to surgery.

## 2022-03-15 NOTE — HISTORY OF PRESENT ILLNESS
[FreeTextEntry1] : 61  M here for follow up visit and new onset of abdominal bulging.  Patient has noticed worsening bulging at his prior ileostomy site.  Denies any obstructive symptoms such as nausea or vomiting.  Eating and drinking well.  Some constipation symptoms but with his bowels regularly with prune juice and fiber supplements.

## 2022-04-01 ENCOUNTER — RX RENEWAL (OUTPATIENT)
Age: 62
End: 2022-04-01

## 2022-04-22 ENCOUNTER — RESULT REVIEW (OUTPATIENT)
Age: 62
End: 2022-04-22

## 2022-05-03 ENCOUNTER — APPOINTMENT (OUTPATIENT)
Dept: SURGERY | Facility: CLINIC | Age: 62
End: 2022-05-03
Payer: COMMERCIAL

## 2022-05-03 VITALS
WEIGHT: 187.4 LBS | OXYGEN SATURATION: 100 % | BODY MASS INDEX: 28.08 KG/M2 | DIASTOLIC BLOOD PRESSURE: 84 MMHG | HEIGHT: 68.5 IN | HEART RATE: 61 BPM | SYSTOLIC BLOOD PRESSURE: 136 MMHG | RESPIRATION RATE: 19 BRPM

## 2022-05-03 PROCEDURE — 99214 OFFICE O/P EST MOD 30 MIN: CPT

## 2022-05-03 NOTE — HISTORY OF PRESENT ILLNESS
[FreeTextEntry1] : 62  M here for follow up visit and new onset of abdominal bulging.  Patient has noticed worsening bulging at his prior ileostomy site.  Denies any obstructive symptoms such as nausea or vomiting.  Eating and drinking well.  Some constipation symptoms but with his bowels regularly with prune juice and fiber supplements.\par \par Patient underwent CT scan of abdomen which shows incisional hernia at site of prior ileostomy. Also has an umbilical hernia and diastasis recti. Small epigastric defect noted but not read on CT scan. Posterior rectus sheath appears to have pulled away from linea alba on imaging. \par \par Continues to have some discomfort from hernia and concerned about bulge that it will worsen.

## 2022-05-03 NOTE — PHYSICAL EXAM
[Abdomen Masses] : No abdominal masses [Respiratory Effort] : normal respiratory effort [No Rash or Lesion] : No rash or lesion [Alert] : alert [Calm] : calm [de-identified] : Splitting of right rectus muscle and thinning of the fascia. Incisional hernia, soft, reducible.  [de-identified] : No acute distress

## 2022-05-03 NOTE — ASSESSMENT
[FreeTextEntry1] : 62 year old male with prior history of robotic assisted low anterior resection with diverting loop ileostomy, then ileostomy closure. patient now with worsening bulge at the prior ileostomy site. CT scan consistent with incisional hernia, diastasis recti, umbilical hernia, small epigastric hernia. Given these findings recommend surgical repair of the patient's hernia. \par \par Discussed operative approaches to incisional hernia including open, laparoscopic, and robotic approaches. Patient has multiple fascial defects, the largest at the incision site of the ileostomy. Recommend robotic assisted incisional hernia repair with mesh. Will likely benefit from eTEP repair to avoid any intra-abdominal adhesions and allow for rectrorectus placement of the mesh. Potentially will need a right sided transversus abdominus release for adequate mesh placement and a tension free repair of the posterior sheath. \par \par Discussed risks and benefits with patient. Risks discussed included infection, bleeding, mesh infection requiring removal, seroma, bowel injury and open surgery. \par \par Patient in agreement with proceeding with surgery. Will get preoperative clearance and covid testing.

## 2022-05-17 ENCOUNTER — RESULT REVIEW (OUTPATIENT)
Age: 62
End: 2022-05-17

## 2022-05-24 ENCOUNTER — RESULT REVIEW (OUTPATIENT)
Age: 62
End: 2022-05-24

## 2022-05-24 ENCOUNTER — APPOINTMENT (OUTPATIENT)
Dept: HEMATOLOGY ONCOLOGY | Facility: CLINIC | Age: 62
End: 2022-05-24
Payer: COMMERCIAL

## 2022-05-24 VITALS
SYSTOLIC BLOOD PRESSURE: 141 MMHG | BODY MASS INDEX: 28.39 KG/M2 | WEIGHT: 189.5 LBS | TEMPERATURE: 96.9 F | OXYGEN SATURATION: 96 % | HEART RATE: 52 BPM | HEIGHT: 68.5 IN | RESPIRATION RATE: 16 BRPM | DIASTOLIC BLOOD PRESSURE: 83 MMHG

## 2022-05-24 DIAGNOSIS — R19.00 INTRA-ABDOMINAL AND PELVIC SWELLING, MASS AND LUMP, UNSPECIFIED SITE: ICD-10-CM

## 2022-05-24 PROCEDURE — 99213 OFFICE O/P EST LOW 20 MIN: CPT

## 2022-05-24 RX ORDER — CHLORHEXIDINE GLUCONATE 4 %
325 (65 FE) LIQUID (ML) TOPICAL DAILY
Qty: 90 | Refills: 3 | Status: COMPLETED | COMMUNITY
Start: 2020-06-26 | End: 2022-05-24

## 2022-05-24 RX ORDER — COLD-HOT PACK
50 EACH MISCELLANEOUS
Refills: 0 | Status: COMPLETED | COMMUNITY
End: 2022-05-24

## 2022-05-24 RX ORDER — MULTIVIT-MIN/FOLIC/VIT K/LYCOP 400-300MCG
1000 TABLET ORAL
Qty: 2 | Refills: 0 | Status: COMPLETED | COMMUNITY
End: 2022-05-24

## 2022-05-24 NOTE — ASSESSMENT
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 Dennis\par 8/29/20 - CT chest - significant improvement in Ground glass interstitial peripheral infiltrates with minimal residual hazy granularity in the EMMANUEL and to lesser extent RUL. Findings suggest chronic vs interval residual or prior pneumonitis\par CT A/P - no suspicious mass or adenopathy within the A/P. \par CEA - 1.6\par Reviewed path - rectal polyp invasive adenocarcinoma arising in a tubulovillous adenoma, up to 1.5 cm in largest fragment. Moderately differentiated, invades into the submucosa, at the cauterized base of polyp.LVI not identified\par 10/9 - flex sig by Dr. Dennis\par Discussed at [facility] - mass at R heart border picked up by radiology on review of scans due to h/o PE. 8/2019 - CTA Heart - There is an extracardiac mass adjacent to the lateral aspect of the RA and to the lateral and anterior aspect of the SVC. There is no contrast uptake with in the mass. 5.2 x 1.7 x 6.1 cm cystic mass is noted contiguous with the R heart border.\par MRI A/P - 10/15/20 - high rectal tumor, T2, No visible mesorectal/superior rectal LN or Extramesorectal LN.\par -PET CT - small, intensely FDG avid lesion in lesion in the sigmoid colon, likely representing that primary tumor. No evidence of metastatic disease.\par - s/p laparoscopic robotic LAR with diverticulectomy and loop ileostomy creation  pT2, N0 - stage I\par - s/p ileostomy closure\par -10/6/ 2021 -reviewed NCCN surveillence guidelines - CNY 9/2021 -tubular adenoma - will receive another CNY in 1 year. No need for scans. CEA pending. \par 1/11/2022- vs and labs reviewed. CEA 0.6 wnl. needs CNY in 9/2022. hgb 12.3, plts 138\par 5/24/22 - vs and labs reviewed. pending CEA - needs cny in Sept. \par \par \par #pericardial mass - persistent. \par 8/2019 - CTA Heart - There is an extracardiac mass adjacent to the lateral aspect of the RA and to the lateral and anterior aspect of the SVC. There is no contrast uptake with in the mass. 5.2 x 1.7 x 6.1 cm cystic mass is noted contiguous with the R heart border.\par PET CT - non avid right atrial appendage\par \par # bilateral PE\par due to COVID 19\par not on O2 any more\par 8/29/20 - CT chest - significant improvement in Ground glass interstitial peripheral infiltrates with minimal residual hazy granularity in the EMMANUEL and to lesser extent RUL. Findings suggest chronic vs interval residual or prior pneumonitis\par follows with Dr. Sandy.\par no on AC any more\par \par #anemia\par bled significantly after CNY. received iron with improvement of hgb\par 10/6/2021 - vs and labs reviewed. hgb 12 . pending ferritin - if low will over IV iron as he had constipation from oral iron\par 1/11/22 - vs and labs reviewed. hgb 12.3\par 5/24/22 - vs and labs reviewed. hgb 13.7\par \par # thrombocytopenia\par monitor - plts 132 k\par may be due to meds vs ITP\par \par RTC in 3 months with cbc with diff, cmp and CEA

## 2022-05-24 NOTE — HISTORY OF PRESENT ILLNESS
[Home] : at home, [unfilled] , at the time of the visit. [Medical Office: ([facility])___] : at the medical office located in  [Verbal consent obtained from patient] : the patient, [unfilled] [de-identified] : Patient seen and examined and here today for follow up\par No rectal bleeding \par having surgery for hernia 6/10 [de-identified] : 60 year old male presents for hospital follow up of pulmonary embolus.  Patient was admitted to [facility] 6/3/2020 and discharged 6/17/2020 with a diagnosis of COVID 19.  During admission patient found to have PE, discharged on Eliquis.

## 2022-05-24 NOTE — CONSULT LETTER
[Dear  ___] : Dear  [unfilled], [Consult Letter:] : I had the pleasure of evaluating your patient, [unfilled]. [Please see my note below.] : Please see my note below. [Consult Closing:] : Thank you very much for allowing me to participate in the care of this patient.  If you have any questions, please do not hesitate to contact me. [Sincerely,] : Sincerely, [FreeTextEntry3] : Tigist Kraus DO, FACVITALIY, FACP\par Medical Oncology and Hematology\par [facility] [place]\par

## 2022-05-25 ENCOUNTER — APPOINTMENT (OUTPATIENT)
Dept: FAMILY MEDICINE | Facility: CLINIC | Age: 62
End: 2022-05-25
Payer: COMMERCIAL

## 2022-05-25 VITALS
DIASTOLIC BLOOD PRESSURE: 76 MMHG | BODY MASS INDEX: 26.97 KG/M2 | TEMPERATURE: 96 F | HEART RATE: 53 BPM | WEIGHT: 180 LBS | SYSTOLIC BLOOD PRESSURE: 124 MMHG | HEIGHT: 68.5 IN

## 2022-05-25 DIAGNOSIS — Z01.818 ENCOUNTER FOR OTHER PREPROCEDURAL EXAMINATION: ICD-10-CM

## 2022-05-25 PROCEDURE — 99213 OFFICE O/P EST LOW 20 MIN: CPT

## 2022-05-26 NOTE — ASSESSMENT
[Patient Optimized for Surgery] : Patient optimized for surgery [No Further Testing Recommended] : no further testing recommended [Continue medications as is] : Continue current medications [As per surgery] : as per surgery [FreeTextEntry4] : Patient is optimized with acceptable risks for his surgery.

## 2022-05-26 NOTE — REVIEW OF SYSTEMS
[Negative] : Heme/Lymph [FreeTextEntry7] : incisional hernia  [de-identified] : stable on medication

## 2022-05-26 NOTE — HISTORY OF PRESENT ILLNESS
[Sleep Apnea] : sleep apnea [No Adverse Anesthesia Reaction] : no adverse anesthesia reaction in self or family member [(Patient denies any chest pain, claudication, dyspnea on exertion, orthopnea, palpitations or syncope)] : Patient denies any chest pain, claudication, dyspnea on exertion, orthopnea, palpitations or syncope [Aortic Stenosis] : no aortic stenosis [Atrial Fibrillation] : no atrial fibrillation [Coronary Artery Disease] : no coronary artery disease [Recent Myocardial Infarction] : no recent myocardial infarction [Implantable Device/Pacemaker] : no implantable device/pacemaker [Chronic Anticoagulation] : no chronic anticoagulation [Chronic Kidney Disease] : no chronic kidney disease [Diabetes] : no diabetes [FreeTextEntry1] : Robotic Incisional Hernia repair w// mesh [FreeTextEntry2] : 6/10/2022 [FreeTextEntry3] : Kal Dennis MD [FreeTextEntry4] : Mr. GERALDINE CELESTIN is a 62 year old male here today for preoperative clearance.\par

## 2022-06-07 ENCOUNTER — RESULT REVIEW (OUTPATIENT)
Age: 62
End: 2022-06-07

## 2022-06-10 ENCOUNTER — RESULT REVIEW (OUTPATIENT)
Age: 62
End: 2022-06-10

## 2022-06-10 ENCOUNTER — APPOINTMENT (OUTPATIENT)
Dept: SURGERY | Facility: HOSPITAL | Age: 62
End: 2022-06-10

## 2022-06-16 ENCOUNTER — TRANSCRIPTION ENCOUNTER (OUTPATIENT)
Age: 62
End: 2022-06-16

## 2022-06-17 ENCOUNTER — APPOINTMENT (OUTPATIENT)
Dept: FAMILY MEDICINE | Facility: CLINIC | Age: 62
End: 2022-06-17
Payer: COMMERCIAL

## 2022-06-17 VITALS
DIASTOLIC BLOOD PRESSURE: 70 MMHG | BODY MASS INDEX: 28.34 KG/M2 | SYSTOLIC BLOOD PRESSURE: 110 MMHG | WEIGHT: 187 LBS | HEIGHT: 68 IN | HEART RATE: 63 BPM

## 2022-06-17 VITALS — TEMPERATURE: 98.6 F

## 2022-06-17 PROCEDURE — 99212 OFFICE O/P EST SF 10 MIN: CPT

## 2022-06-17 NOTE — HISTORY OF PRESENT ILLNESS
[FreeTextEntry1] : F/U [de-identified] : Mr. GERALDINE CELESTIN is a 62 year old male here today for one week follow-up from hernia repair. Doing well. Pain is less and nno more fevers. Seeing surgeon on Tuesday. \par

## 2022-06-17 NOTE — HEALTH RISK ASSESSMENT
[Never] : Never [No] : In the past 12 months have you used drugs other than those required for medical reasons? No [No falls in past year] : Patient reported no falls in the past year [0] : 2) Feeling down, depressed, or hopeless: Not at all (0) [PHQ-2 Negative - No further assessment needed] : PHQ-2 Negative - No further assessment needed [de-identified] : walking 1.5 miles a day [de-identified] : normal [[id number]] : 0

## 2022-06-21 ENCOUNTER — APPOINTMENT (OUTPATIENT)
Dept: SURGERY | Facility: CLINIC | Age: 62
End: 2022-06-21

## 2022-06-21 VITALS
SYSTOLIC BLOOD PRESSURE: 121 MMHG | DIASTOLIC BLOOD PRESSURE: 69 MMHG | WEIGHT: 187 LBS | OXYGEN SATURATION: 100 % | RESPIRATION RATE: 14 BRPM | TEMPERATURE: 99 F | HEART RATE: 56 BPM | HEIGHT: 68 IN | BODY MASS INDEX: 28.34 KG/M2

## 2022-06-21 PROCEDURE — 99024 POSTOP FOLLOW-UP VISIT: CPT

## 2022-06-21 NOTE — PHYSICAL EXAM
[Abdomen Masses] : No abdominal masses [Abdomen Tenderness] : ~T No ~M abdominal tenderness [JVD] : no jugular venous distention  [Respiratory Effort] : normal respiratory effort [No Rash or Lesion] : No rash or lesion [Alert] : alert [de-identified] : Incisions healing well.  Fascial ridge appreciated the midline.  No hernia appreciable with Valsalva or cough. [de-identified] : No acute distress

## 2022-06-21 NOTE — ASSESSMENT
[FreeTextEntry1] : 62-year-old male status post robotic assisted incisional hernia repair with bilateral release of the posterior sheath rectus as well as a right-sided transversus abdominis release.  Patient healing well.\par \par Continue to wear the abdominal binder for another 2 weeks.  Can return to work on 7 July.  Can return to normal lifting activities 2 weeks after that.  Recommend follow-up in approximately 6 weeks to confirm that he is doing well.  Can then follow-up as needed afterwards.

## 2022-06-21 NOTE — HISTORY OF PRESENT ILLNESS
[FreeTextEntry1] : 62-year-old male here for postop visit after robotic assisted incisional hernia repair.  Patient underwent robotic assisted incisional hernia repair with retrorectus placement of mesh including a right-sided chambers abdominal release.  Patient doing well since surgery.  Continues to wear the abdominal binder.  Abdominal discomfort continues to improve.  Thought he was having some low-grade fevers but only measured 99.6.  Eating and drink without issue.  Incisions healing well

## 2022-08-02 ENCOUNTER — APPOINTMENT (OUTPATIENT)
Dept: SURGERY | Facility: CLINIC | Age: 62
End: 2022-08-02

## 2022-08-02 VITALS
SYSTOLIC BLOOD PRESSURE: 112 MMHG | OXYGEN SATURATION: 100 % | DIASTOLIC BLOOD PRESSURE: 65 MMHG | HEART RATE: 53 BPM | RESPIRATION RATE: 18 BRPM

## 2022-08-02 PROCEDURE — 99024 POSTOP FOLLOW-UP VISIT: CPT

## 2022-08-02 NOTE — ASSESSMENT
Called Latvian , Dunia.  Asked her to translate My New Journey: Living Smart After My Liver Transplant to patient and family this weekend.  Asked Dunia to inform the daughter that [employer] is not an approved service to translate.  Dunia will speak to daughter about this.     [FreeTextEntry1] : 8-year-old male here for postop visit after robotic assisted incisional hernia repair.  Patient doing well in recovery.  Can return to regular duties.  Can return to regular diet.  Does not need to wear an abdominal binder anymore.\par \par We will schedule patient for colonoscopy for surveillance for his prior rectal cancer diagnosis.  Bowel prep instructions provided.

## 2022-08-02 NOTE — HISTORY OF PRESENT ILLNESS
[FreeTextEntry1] : 62-year-old male here for postop visit after robotic assisted incisional hernia repair.  Patient underwent robotic assisted incisional hernia repair with retrorectus placement of mesh including a right-sided chambers abdominal release.  Patient doing well since surgery.  Continues to wear the abdominal binder.  Abdominal discomfort continues to improve.  Thought he was having some low-grade fevers but only measured 99.6.  Eating and drink without issue.  Incisions healing well\par \par \par Doing well since last visit.  Continues to eat and drink without issue.  Denies any nausea vomiting or fevers.  Thought he noticed a slight bulge at the prior ileostomy site but denies significant pain.  Occasional constipation still.  Patient states he is due for his surveillance colonoscopy after his rectal cancer diagnosis in September.

## 2022-08-02 NOTE — PHYSICAL EXAM
[Abdomen Masses] : No abdominal masses [Abdomen Tenderness] : ~T No ~M abdominal tenderness [JVD] : no jugular venous distention  [Respiratory Effort] : normal respiratory effort [No Rash or Lesion] : No rash or lesion [Alert] : alert [de-identified] : Incisions healing well.  Fascial ridge appreciated the midline.  No hernia appreciable with Valsalva or cough. [de-identified] : No acute distress

## 2022-08-15 ENCOUNTER — APPOINTMENT (OUTPATIENT)
Dept: PULMONOLOGY | Facility: CLINIC | Age: 62
End: 2022-08-15

## 2022-08-15 VITALS
SYSTOLIC BLOOD PRESSURE: 130 MMHG | HEART RATE: 54 BPM | OXYGEN SATURATION: 99 % | WEIGHT: 187 LBS | HEIGHT: 68 IN | BODY MASS INDEX: 28.34 KG/M2 | DIASTOLIC BLOOD PRESSURE: 70 MMHG

## 2022-08-15 PROCEDURE — 99213 OFFICE O/P EST LOW 20 MIN: CPT

## 2022-08-15 NOTE — PHYSICAL EXAM
[No Acute Distress] : no acute distress [Normal Appearance] : normal appearance [Normal Rate/Rhythm] : normal rate/rhythm [Normal S1, S2] : normal s1, s2 [No Resp Distress] : no resp distress [Clear to Auscultation Bilaterally] : clear to auscultation bilaterally [Normal Gait] : normal gait [No Clubbing] : no clubbing [No Cyanosis] : no cyanosis [No Focal Deficits] : no focal deficits [Oriented x3] : oriented x3 [Normal Affect] : normal affect

## 2022-08-15 NOTE — HISTORY OF PRESENT ILLNESS
[TextBox_4] : 62 year old male with colon CA, MARILUZ, PE after a Covid infection came for f/u.\par Last seen in 2022\par Compliance report\par GERALDINE CELESTIN\par 2022 - 2022\par Patient ID: [id number]\par : 1960\par Age: 62 years\[place] Compliance Report\par Usage 2022 - 2022\par Usage days 30/30 days (100%)\par >= 4 hours 30 days (100%)\par < 4 hours 0 days (0%)\par Usage hours 227 hours 9 minutes\par Average usage (total days) 7 hours 34 minutes\par Average usage (days used) 7 hours 34 minutes\par Median usage (days used) 7 hours 47 minutes\par Total used hours (value since last reset - 2022) 6,279 hours\par AirSense 10 AutoSet\par Serial number [id number]\par Mode CPAP\par Set pressure 13 cmH2O\par EPR Fulltime\par EPR level 3\par Therapy\par Leaks - L/min Median: 2.7 95th percentile: 4.9 Maximum: 9.0\par Events per hour AI: 0.5 HI: 0.2 AHI: 0.7\par Apnea Index Central: 0.1 Obstructive: 0.3 Unknown: 0.0\par RERA Index 0.3\par Cheyne-Ricci respiration (average duration per night) 0 minutes (0%)\par \par Feels OK. He is able to exercise \par He is using CPAP well, almost every night\par No problems with PAP.\par Sleeps well with it\par No mask problems\par He is off AC for long time. No respiratory complaints\par

## 2022-08-27 ENCOUNTER — LABORATORY RESULT (OUTPATIENT)
Age: 62
End: 2022-08-27

## 2022-09-02 ENCOUNTER — RESULT REVIEW (OUTPATIENT)
Age: 62
End: 2022-09-02

## 2022-09-02 ENCOUNTER — APPOINTMENT (OUTPATIENT)
Dept: HEMATOLOGY ONCOLOGY | Facility: CLINIC | Age: 62
End: 2022-09-02

## 2022-09-02 VITALS
SYSTOLIC BLOOD PRESSURE: 133 MMHG | TEMPERATURE: 97.1 F | HEART RATE: 55 BPM | RESPIRATION RATE: 16 BRPM | DIASTOLIC BLOOD PRESSURE: 85 MMHG | BODY MASS INDEX: 28.3 KG/M2 | WEIGHT: 186.7 LBS | HEIGHT: 68 IN | OXYGEN SATURATION: 100 %

## 2022-09-02 DIAGNOSIS — D72.819 DECREASED WHITE BLOOD CELL COUNT, UNSPECIFIED: ICD-10-CM

## 2022-09-02 DIAGNOSIS — I31.8 OTHER SPECIFIED DISEASES OF PERICARDIUM: ICD-10-CM

## 2022-09-02 PROCEDURE — 99213 OFFICE O/P EST LOW 20 MIN: CPT | Mod: 25

## 2022-09-02 PROCEDURE — 36415 COLL VENOUS BLD VENIPUNCTURE: CPT

## 2022-09-02 RX ORDER — NIRMATRELVIR AND RITONAVIR 300-100 MG
20 X 150 MG & KIT ORAL
Qty: 30 | Refills: 0 | Status: COMPLETED | COMMUNITY
Start: 2022-05-30 | End: 2022-09-02

## 2022-09-02 RX ORDER — OXYCODONE 5 MG/1
5 TABLET ORAL
Qty: 15 | Refills: 0 | Status: COMPLETED | COMMUNITY
Start: 2022-06-10 | End: 2022-09-02

## 2022-09-02 RX ORDER — DOCUSATE SODIUM 100 MG/1
100 CAPSULE ORAL
Refills: 0 | Status: ACTIVE | COMMUNITY
Start: 2022-09-02

## 2022-09-02 NOTE — ASSESSMENT
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 Dennis\par 8/29/20 - CT chest - significant improvement in Ground glass interstitial peripheral infiltrates with minimal residual hazy granularity in the EMMANUEL and to lesser extent RUL. Findings suggest chronic vs interval residual or prior pneumonitis\par CT A/P - no suspicious mass or adenopathy within the A/P. \par CEA - 1.6\par Reviewed path - rectal polyp invasive adenocarcinoma arising in a tubulovillous adenoma, up to 1.5 cm in largest fragment. Moderately differentiated, invades into the submucosa, at the cauterized base of polyp.LVI not identified\par 10/9 - flex sig by Dr. Dennis\par Discussed at [facility] - mass at R heart border picked up by radiology on review of scans due to h/o PE. 8/2019 - CTA Heart - There is an extracardiac mass adjacent to the lateral aspect of the RA and to the lateral and anterior aspect of the SVC. There is no contrast uptake with in the mass. 5.2 x 1.7 x 6.1 cm cystic mass is noted contiguous with the R heart border.\par MRI A/P - 10/15/20 - high rectal tumor, T2, No visible mesorectal/superior rectal LN or Extramesorectal LN.\par -PET CT - small, intensely FDG avid lesion in lesion in the sigmoid colon, likely representing that primary tumor. No evidence of metastatic disease.\par - s/p laparoscopic robotic LAR with diverticulectomy and loop ileostomy creation  pT2, N0 - stage I\par - s/p ileostomy closure\par -10/6/ 2021 -reviewed NCCN surveillence guidelines - CNY 9/2021 -tubular adenoma - will receive another CNY in 1 year. No need for scans. CEA pending. \par 1/11/2022- vs and labs reviewed. CEA 0.6 wnl. needs CNY in 9/2022. hgb 12.3, plts 138\par 5/24/22 - vs and labs reviewed. pending CEA - needs cny in Sept. \par 9/2/2022 vs and CBC reviewed; repeat CNY with Dr. Dennis pending 10/7/2022; CEA last 1.1\par \par #pericardial mass - persistent. \par 8/2019 - CTA Heart - There is an extracardiac mass adjacent to the lateral aspect of the RA and to the lateral and anterior aspect of the SVC. There is no contrast uptake with in the mass. 5.2 x 1.7 x 6.1 cm cystic mass is noted contiguous with the R heart border.\par PET CT - non avid right atrial appendage\par \par # bilateral PE\par due to COVID 19\par not on O2 any more\par 8/29/20 - CT chest - significant improvement in Ground glass interstitial peripheral infiltrates with minimal residual hazy granularity in the EMMANUEL and to lesser extent RUL. Findings suggest chronic vs interval residual or prior pneumonitis\par follows with Dr. Sandy.\par no on AC any more\par \par #anemia\par bled significantly after CNY. received iron with improvement of hgb\par 10/6/2021 - vs and labs reviewed. hgb 12 . pending ferritin - if low will over IV iron as he had constipation from oral iron\par 1/11/22 - vs and labs reviewed. hgb 12.3\par 5/24/22 - vs and labs reviewed. hgb 13.7\par 9/2/2022 hgb 12.5, stable\par \par # thrombocytopenia\par monitor - plts 132 k\par may be due to meds vs ITP\par 9/2/2022 plt 105K\par \par #leukopenia\par 9/2/2022 WBC 3.9, continue monitor \par \par Case and management discussed with Dr. Kraus\par RTC in 3 months with cbc with diff, cmp and CEA

## 2022-09-02 NOTE — CONSULT LETTER
[Dear  ___] : Dear  [unfilled], [Consult Letter:] : I had the pleasure of evaluating your patient, [unfilled]. [Please see my note below.] : Please see my note below. [Consult Closing:] : Thank you very much for allowing me to participate in the care of this patient.  If you have any questions, please do not hesitate to contact me. [Sincerely,] : Sincerely, [FreeTextEntry3] : Tigist Kraus DO, FACVITALIY, FACP\par Medical Oncology and Hematology\par [facility] [place]\par

## 2022-09-02 NOTE — HISTORY OF PRESENT ILLNESS
[de-identified] : Patient seen and examined and here today for follow up.\par He reports feeling well and offers chronic constipation.\par He denies dizziness, lightheadedness, SOB, palpitations, nausea, vomiting, and diarrhea\par  [de-identified] : 60 year old male presents for hospital follow up of pulmonary embolus.  Patient was admitted to [facility] 6/3/2020 and discharged 6/17/2020 with a diagnosis of COVID 19.  During admission patient found to have PE, discharged on Eliquis.

## 2022-09-22 ENCOUNTER — RESULT REVIEW (OUTPATIENT)
Age: 62
End: 2022-09-22

## 2022-09-22 ENCOUNTER — APPOINTMENT (OUTPATIENT)
Dept: NEPHROLOGY | Facility: CLINIC | Age: 62
End: 2022-09-22

## 2022-09-22 VITALS
BODY MASS INDEX: 27.28 KG/M2 | HEIGHT: 68 IN | OXYGEN SATURATION: 98 % | DIASTOLIC BLOOD PRESSURE: 70 MMHG | WEIGHT: 180 LBS | SYSTOLIC BLOOD PRESSURE: 130 MMHG | HEART RATE: 60 BPM | TEMPERATURE: 97.8 F

## 2022-09-22 DIAGNOSIS — N18.30 CHRONIC KIDNEY DISEASE, STAGE 3 UNSPECIFIED: ICD-10-CM

## 2022-09-22 PROCEDURE — 99214 OFFICE O/P EST MOD 30 MIN: CPT

## 2022-09-22 NOTE — ASSESSMENT
[FreeTextEntry1] : HTN since age 18, SBP improved, DBP elevated,no hyepraldo, found to have MARILUZ - using CPAP, but cont to have diff sleeping - counseled on low salt diet as this may explain elevated DBP\par \par CKD resolved - cr 1.1 , ckd 3 resolved\par \par Hyponatremia - has been drinking 10+ cups of water/day, advised he reduce itto nomore than 6\par \par Hyperuricemia - cont allopurino, recheck urate levell\par \par Constipation - adequate fluid intake, was not present prior to colonic resection and reasnastamosis - suspect constriction, will have colonoscopy, in interim try otc ex lax\par \par MARILUZ - f/u with Pulm\par \par Heme note,GI ote, Gsurg note, pulm note revciewed\par BMET, CBC, uric acid revieed\par - check BMET, Aracely, SOsm\par \par f/u in 2-3 months\par \par \par

## 2022-09-22 NOTE — HISTORY OF PRESENT ILLNESS
[FreeTextEntry1] : 63 yo male from [facility] - here for f/u of TASHA on CKD, with adjustments in BP meds and initiation of allopurinol creatinine has improved to 1.1/1.2 from high of 1.5;\par \par Diagnosed with colon cancer 2020 August, no chemo, stage 1, colon resection, ilostomy, reversed 2/2021, has been constipated since, cr improved, weight improved ( lost weight), has been drinking more water 2/2\par \par Continues to struggle with insomnia, though has MARILUZ and is using CPAP\par \par \par from initial  visit; [facility] male  with hx of bipolar d/o never on Lithium, HTN ( on 5 meds), hx of syncope ( was seen by cardiology) - creatinine fluctuating from 1.29 to 1.68 over past year - labs also indicate both low and high SNa, raising possibility that his fluid intake varies, furthermore, fasts for his blood tests and may fast for as long as 14 hours\par  \par SHx: denies NSAIDNo smoking no alcohol\par FHx:no FHx of CKD

## 2022-09-22 NOTE — PHYSICAL EXAM
[General Appearance - Alert] : alert [General Appearance - In No Acute Distress] : in no acute distress [Sclera] : the sclera and conjunctiva were normal [Extraocular Movements] : extraocular movements were intact [Outer Ear] : the ears and nose were normal in appearance [Hearing Threshold Finger Rub Not [place]] : hearing was normal [] : no respiratory distress [Exaggerated Use Of Accessory Muscles For Inspiration] : no accessory muscle use [Apical Impulse] : the apical impulse was normal [Heart Rate And Rhythm] : heart rate was normal and rhythm regular [Bowel Sounds] : normal bowel sounds [Abdomen Soft] : soft [Abnormal Walk] : normal gait [Musculoskeletal - Swelling] : no joint swelling seen [Skin Color & Pigmentation] : normal skin color and pigmentation [Skin Turgor] : normal skin turgor [Cranial Nerves] : cranial nerves 2-12 were intact [No Focal Deficits] : no focal deficits [Oriented To Time, Place, And Person] : oriented to person, place, and time [Impaired Insight] : insight and judgment were intact [Affect] : the affect was normal [Mood] : the mood was normal

## 2022-10-04 ENCOUNTER — RESULT REVIEW (OUTPATIENT)
Age: 62
End: 2022-10-04

## 2022-10-07 ENCOUNTER — APPOINTMENT (OUTPATIENT)
Dept: SURGERY | Facility: HOSPITAL | Age: 62
End: 2022-10-07

## 2022-10-17 ENCOUNTER — APPOINTMENT (OUTPATIENT)
Dept: FAMILY MEDICINE | Facility: CLINIC | Age: 62
End: 2022-10-17

## 2022-10-17 VITALS
DIASTOLIC BLOOD PRESSURE: 80 MMHG | OXYGEN SATURATION: 98 % | WEIGHT: 183 LBS | SYSTOLIC BLOOD PRESSURE: 140 MMHG | RESPIRATION RATE: 15 BRPM | BODY MASS INDEX: 27.74 KG/M2 | HEIGHT: 68 IN | TEMPERATURE: 98.1 F | HEART RATE: 61 BPM

## 2022-10-17 DIAGNOSIS — R74.8 ABNORMAL LEVELS OF OTHER SERUM ENZYMES: ICD-10-CM

## 2022-10-17 PROCEDURE — 99396 PREV VISIT EST AGE 40-64: CPT | Mod: 25

## 2022-10-17 PROCEDURE — 36415 COLL VENOUS BLD VENIPUNCTURE: CPT

## 2022-10-17 PROCEDURE — 90686 IIV4 VACC NO PRSV 0.5 ML IM: CPT

## 2022-10-17 PROCEDURE — 90750 HZV VACC RECOMBINANT IM: CPT

## 2022-10-17 PROCEDURE — G0008: CPT | Mod: 59

## 2022-10-17 PROCEDURE — 99213 OFFICE O/P EST LOW 20 MIN: CPT | Mod: 25

## 2022-10-17 PROCEDURE — 90472 IMMUNIZATION ADMIN EACH ADD: CPT

## 2022-10-17 RX ORDER — AMOXICILLIN AND CLAVULANATE POTASSIUM 875; 125 MG/1; MG/1
875-125 TABLET, COATED ORAL
Qty: 14 | Refills: 0 | Status: DISCONTINUED | COMMUNITY
Start: 2022-05-31 | End: 2022-10-17

## 2022-10-17 NOTE — PHYSICAL EXAM
[No Acute Distress] : no acute distress [Well Nourished] : well nourished [Well Developed] : well developed [Well-Appearing] : well-appearing [Normal Sclera/Conjunctiva] : normal sclera/conjunctiva [PERRL] : pupils equal round and reactive to light [EOMI] : extraocular movements intact [Normal Outer Ear/Nose] : the outer ears and nose were normal in appearance [Normal Oropharynx] : the oropharynx was normal [No JVD] : no jugular venous distention [No Lymphadenopathy] : no lymphadenopathy [Supple] : supple [Thyroid Normal, No Nodules] : the thyroid was normal and there were no nodules present [No Respiratory Distress] : no respiratory distress  [No Accessory Muscle Use] : no accessory muscle use [Clear to Auscultation] : lungs were clear to auscultation bilaterally [Normal Rate] : normal rate  [Regular Rhythm] : with a regular rhythm [Normal S1, S2] : normal S1 and S2 [No Murmur] : no murmur heard [No Carotid Bruits] : no carotid bruits [No Abdominal Bruit] : a ~M bruit was not heard ~T in the abdomen [No Varicosities] : no varicosities [Pedal Pulses Present] : the pedal pulses are present [No Edema] : there was no peripheral edema [No Palpable Aorta] : no palpable aorta [No Extremity Clubbing/Cyanosis] : no extremity clubbing/cyanosis [Soft] : abdomen soft [Non Tender] : non-tender [Non-distended] : non-distended [No Masses] : no abdominal mass palpated [No HSM] : no HSM [Normal Bowel Sounds] : normal bowel sounds [Normal Posterior Cervical Nodes] : no posterior cervical lymphadenopathy [Normal Anterior Cervical Nodes] : no anterior cervical lymphadenopathy [No CVA Tenderness] : no CVA  tenderness [No Spinal Tenderness] : no spinal tenderness [No Joint Swelling] : no joint swelling [Grossly Normal Strength/Tone] : grossly normal strength/tone [No Rash] : no rash [Coordination Grossly Intact] : coordination grossly intact [No Focal Deficits] : no focal deficits [Normal Gait] : normal gait [Deep Tendon Reflexes (DTR)] : deep tendon reflexes were 2+ and symmetric [Normal Affect] : the affect was normal [Normal Insight/Judgement] : insight and judgment were intact [de-identified] : Well healed surgical scars

## 2022-10-24 LAB
ALBUMIN SERPL ELPH-MCNC: 4.5 G/DL
ALP BLD-CCNC: 41 U/L
ALT SERPL-CCNC: 14 U/L
ANION GAP SERPL CALC-SCNC: 16 MMOL/L
AST SERPL-CCNC: 29 U/L
BASOPHILS # BLD AUTO: 0.02 K/UL
BASOPHILS NFR BLD AUTO: 0.4 %
BILIRUB SERPL-MCNC: 0.4 MG/DL
BUN SERPL-MCNC: 16 MG/DL
CALCIUM SERPL-MCNC: 9.1 MG/DL
CHLORIDE SERPL-SCNC: 107 MMOL/L
CHOLEST SERPL-MCNC: 141 MG/DL
CO2 SERPL-SCNC: 24 MMOL/L
CREAT SERPL-MCNC: 1.04 MG/DL
EGFR: 82 ML/MIN/1.73M2
EOSINOPHIL # BLD AUTO: 0.05 K/UL
EOSINOPHIL NFR BLD AUTO: 1.1 %
GLUCOSE SERPL-MCNC: 82 MG/DL
HCT VFR BLD CALC: 39.3 %
HDLC SERPL-MCNC: 55 MG/DL
HGB BLD-MCNC: 12.4 G/DL
IMM GRANULOCYTES NFR BLD AUTO: 0.2 %
LDLC SERPL CALC-MCNC: 78 MG/DL
LYMPHOCYTES # BLD AUTO: 1.63 K/UL
LYMPHOCYTES NFR BLD AUTO: 34.7 %
MAN DIFF?: NORMAL
MCHC RBC-ENTMCNC: 30.7 PG
MCHC RBC-ENTMCNC: 31.6 GM/DL
MCV RBC AUTO: 97.3 FL
MONOCYTES # BLD AUTO: 0.39 K/UL
MONOCYTES NFR BLD AUTO: 8.3 %
NEUTROPHILS # BLD AUTO: 2.6 K/UL
NEUTROPHILS NFR BLD AUTO: 55.3 %
NONHDLC SERPL-MCNC: 86 MG/DL
PLATELET # BLD AUTO: 132 K/UL
POTASSIUM SERPL-SCNC: 4.6 MMOL/L
PROT SERPL-MCNC: 6.8 G/DL
PSA SERPL-MCNC: 0.7 NG/ML
RBC # BLD: 4.04 M/UL
RBC # FLD: 14.1 %
SODIUM SERPL-SCNC: 146 MMOL/L
TRIGL SERPL-MCNC: 42 MG/DL
TSH SERPL-ACNC: 0.85 UIU/ML
URATE SERPL-MCNC: 6.2 MG/DL
WBC # FLD AUTO: 4.7 K/UL

## 2022-12-09 ENCOUNTER — RESULT REVIEW (OUTPATIENT)
Age: 62
End: 2022-12-09

## 2022-12-09 ENCOUNTER — APPOINTMENT (OUTPATIENT)
Dept: HEMATOLOGY ONCOLOGY | Facility: CLINIC | Age: 62
End: 2022-12-09

## 2022-12-09 VITALS
HEART RATE: 59 BPM | TEMPERATURE: 97.1 F | HEIGHT: 68 IN | WEIGHT: 190.13 LBS | SYSTOLIC BLOOD PRESSURE: 131 MMHG | DIASTOLIC BLOOD PRESSURE: 84 MMHG | OXYGEN SATURATION: 100 % | RESPIRATION RATE: 18 BRPM | BODY MASS INDEX: 28.81 KG/M2

## 2022-12-09 PROCEDURE — 99214 OFFICE O/P EST MOD 30 MIN: CPT | Mod: 25

## 2022-12-09 NOTE — CONSULT LETTER
[Dear  ___] : Dear  [unfilled], [Consult Letter:] : I had the pleasure of evaluating your patient, [unfilled]. [Please see my note below.] : Please see my note below. [Consult Closing:] : Thank you very much for allowing me to participate in the care of this patient.  If you have any questions, please do not hesitate to contact me. [Sincerely,] : Sincerely, [FreeTextEntry3] : Tigist Kraus DO, FACVITALIY, FACP\par Medical Oncology and Hematology\par [facility] [place]\par

## 2022-12-09 NOTE — HISTORY OF PRESENT ILLNESS
[de-identified] : Patient seen and examined and here today for follow up. Overall feels well with no new issues. \par Patient states that he has intermittent foot pain on the left foot, worse in the morning and gets better throughout the day. \par He was recently treated for gout on his right leg and is taking allopurinol because of elevated uric acid levels \par He went to see  PCP and he had his annual physical which revealed that his sodium was low and he followed up with nephrologist which at that time it improved. \par \par  [de-identified] : 60 year old male presents for hospital follow up of pulmonary embolus.  Patient was admitted to [place] 6/3/2020 and discharged 6/17/2020 with a diagnosis of COVID 19.  During admission patient found to have PE, discharged on Eliquis.

## 2022-12-09 NOTE — ASSESSMENT
[FreeTextEntry1] : #rectal adenocarcinoma found on CNY - stage 1.\par followed up with Dr. Dennis\par 8/29/20 - CT chest - significant improvement in Ground glass interstitial peripheral infiltrates with minimal residual hazy granularity in the EMMANUEL and to lesser extent RUL. Findings suggest chronic vs interval residual or prior pneumonitis\par CT A/P - no suspicious mass or adenopathy within the A/P. \par CEA - 1.6\par Reviewed path - rectal polyp invasive adenocarcinoma arising in a tubulovillous adenoma, up to 1.5 cm in largest fragment. Moderately differentiated, invades into the submucosa, at the cauterized base of polyp.LVI not identified\par 10/9 - flex sig by Dr. Dennis\par Discussed at [facility] - mass at R heart border picked up by radiology on review of scans due to h/o PE. 8/2019 - CTA Heart - There is an extracardiac mass adjacent to the lateral aspect of the RA and to the lateral and anterior aspect of the SVC. There is no contrast uptake with in the mass. 5.2 x 1.7 x 6.1 cm cystic mass is noted contiguous with the R heart border.\par MRI A/P - 10/15/20 - high rectal tumor, T2, No visible mesorectal/superior rectal LN or Extramesorectal LN.\par -PET CT - small, intensely FDG avid lesion in lesion in the sigmoid colon, likely representing that primary tumor. No evidence of metastatic disease.\par - s/p laparoscopic robotic LAR with diverticulectomy and loop ileostomy creation  pT2, N0 - stage I\par - s/p ileostomy closure\par -10/6/ 2021 -reviewed NCCN surveillence guidelines - CNY 9/2021 -tubular adenoma - will receive another CNY in 1 year. No need for scans. CEA pending. \par 1/11/2022- vs and labs reviewed. CEA 0.6 wnl. needs CNY in 9/2022. hgb 12.3, plts 138\par 5/24/22 - vs and labs reviewed. pending CEA - needs cny in Sept. \par 9/2/2022 vs and CBC reviewed; repeat CNY with Dr. Dennis pending 10/7/2022; CEA last 1.1\par 12/9/22 - vs reviewed. labs drawn in office today. hgb 12, plts 125.wbc wnl. kidney function, liver function, electrolytes wnl. CEA pending. Had CNY 10/7/22 with Dr. Dennis - normal mucosa in whole colon. continue current care, CNY 3 years 10/ 2025. CEA 1.0 (9/22)\par \par #pericardial mass - persistent. \par 8/2019 - CTA Heart - There is an extracardiac mass adjacent to the lateral aspect of the RA and to the lateral and anterior aspect of the SVC. There is no contrast uptake with in the mass. 5.2 x 1.7 x 6.1 cm cystic mass is noted contiguous with the R heart border.\par PET CT - non avid right atrial appendage\par \par # bilateral PE\par due to COVID 19\par not on O2 any more\par 8/29/20 - CT chest - significant improvement in Ground glass interstitial peripheral infiltrates with minimal residual hazy granularity in the EMMANUEL and to lesser extent RUL. Findings suggest chronic vs interval residual or prior pneumonitis\par follows with Dr. Sandy.\par no on AC any more\par \par #anemia\par 12.0 - check iron, ferritin, b12, folate, \par \par # thrombocytopenia\par 125 k - monitor\par \par RTC in 6  months with cbc with diff, cmp and CEA. iron, ferritin, b12,folate, vit D, ESR/CRP

## 2022-12-09 NOTE — REVIEW OF SYSTEMS
[Negative] : Allergic/Immunologic [Muscle Pain] : muscle pain [FreeTextEntry2] : review of systems completed, negative unless otherwise noted above [FreeTextEntry9] : foot pain

## 2022-12-22 ENCOUNTER — APPOINTMENT (OUTPATIENT)
Dept: NEPHROLOGY | Facility: CLINIC | Age: 62
End: 2022-12-22

## 2022-12-22 ENCOUNTER — RESULT REVIEW (OUTPATIENT)
Age: 62
End: 2022-12-22

## 2022-12-22 VITALS
HEART RATE: 52 BPM | WEIGHT: 187 LBS | SYSTOLIC BLOOD PRESSURE: 118 MMHG | HEIGHT: 68 IN | OXYGEN SATURATION: 100 % | DIASTOLIC BLOOD PRESSURE: 68 MMHG | BODY MASS INDEX: 28.34 KG/M2

## 2022-12-22 PROCEDURE — 99214 OFFICE O/P EST MOD 30 MIN: CPT

## 2022-12-22 NOTE — HISTORY OF PRESENT ILLNESS
[FreeTextEntry1] : 63 yo male from [facility] - here for f/u of TASHA on CKD, with adjustments in BP meds and initiation of allopurinol creatinine has improved to 1.1/1.2 from high of 1.5;\par \par Diagnosed with colon cancer 2020 August, no chemo, stage 1, colon resection, ileostomy, reversed 2/2021, was constipated since, cr improved, weight improved ( lost weight)\par \par Continues to struggle with insomnia, though has MARILUZ and is using CPAP\par \par \par from initial  visit; \par [place] male  with hx of bipolar d/o never on Lithium, HTN ( on 5 meds), hx of syncope ( was seen by cardiology) - creatinine fluctuating from 1.29 to 1.68 over past year - labs also indicate both low and high SNa, raising possibility that his fluid intake varies, furthermore, fasts for his blood tests and may fast for as long as 14 hours\par  \par SHx: denies NSAID No smoking no alcohol\par FHx:no FHx of CKD

## 2022-12-22 NOTE — PHYSICAL EXAM
[General Appearance - Alert] : alert [General Appearance - In No Acute Distress] : in no acute distress [Sclera] : the sclera and conjunctiva were normal [Extraocular Movements] : extraocular movements were intact [Outer Ear] : the ears and nose were normal in appearance [Hearing Threshold Finger Rub Not [place]] : hearing was normal [] : no respiratory distress [Exaggerated Use Of Accessory Muscles For Inspiration] : no accessory muscle use [Apical Impulse] : the apical impulse was normal [Heart Rate And Rhythm] : heart rate was normal and rhythm regular [Bowel Sounds] : normal bowel sounds [Abdomen Soft] : soft [Abnormal Walk] : normal gait [Musculoskeletal - Swelling] : no joint swelling seen [Skin Color & Pigmentation] : normal skin color and pigmentation [Skin Turgor] : normal skin turgor [Cranial Nerves] : cranial nerves 2-12 were intact [No Focal Deficits] : no focal deficits [Oriented To Time, Place, And Person] : oriented to person, place, and time [Impaired Insight] : insight and judgment were intact [Affect] : the affect was normal [Mood] : the mood was normal

## 2022-12-22 NOTE — ASSESSMENT
[FreeTextEntry1] : HTN since age 18, SBP improved, DBP elevated,no hyepraldo, found to have MARILUZ - using CPAP, but cont to have diff sleeping - counseled on low salt diet as this may explain elevated DBP\par \par CKD resolved - cr 1.1 , ckd 3 resolved\par \par Hyponatremia - has been drinking 10+ cups of water/day, advised he reduce it to no more than 6\par \par Hyperuricemia - cont allopurinol, recheck urate level\par \par Constipation - adequate fluid intake, was not present prior to colonic resection and re-asnastamosis - suspect constriction, had colonoscopy,cont otc ex-lax\par \par MARILUZ - admonished to use CPAP - BP improved\par \par Heme note,GI ote, Gsurg note, pulm note reviewed\par BMET, CBC, uric acid reviewed\par \par \par f/u in 2-3 months\par \par \par

## 2023-02-01 ENCOUNTER — RX RENEWAL (OUTPATIENT)
Age: 63
End: 2023-02-01

## 2023-02-21 ENCOUNTER — APPOINTMENT (OUTPATIENT)
Dept: PULMONOLOGY | Facility: CLINIC | Age: 63
End: 2023-02-21
Payer: COMMERCIAL

## 2023-02-21 VITALS
SYSTOLIC BLOOD PRESSURE: 126 MMHG | HEART RATE: 68 BPM | DIASTOLIC BLOOD PRESSURE: 80 MMHG | BODY MASS INDEX: 27.89 KG/M2 | TEMPERATURE: 97.3 F | HEIGHT: 68 IN | OXYGEN SATURATION: 100 % | WEIGHT: 184 LBS

## 2023-02-21 DIAGNOSIS — G47.00 INSOMNIA, UNSPECIFIED: ICD-10-CM

## 2023-02-21 DIAGNOSIS — G47.30 SLEEP APNEA, UNSPECIFIED: ICD-10-CM

## 2023-02-21 PROCEDURE — 99214 OFFICE O/P EST MOD 30 MIN: CPT

## 2023-02-21 NOTE — HISTORY OF PRESENT ILLNESS
[TextBox_4] : 63 year old male with MARILUZ on PAP came for f/u.\par Last seen in 2022\par Compliance report:\par GERALDINE CELESTIN\par 2023 - 2023\par Patient ID: [id number]\par : 1960\par Age: 63 years[place] Compliance Report\par Usage 2023 - 2023\par Usage days 30/30 days (100%)\par >= 4 hours 30 days (100%)\par < 4 hours 0 days (0%)\par Usage hours 232 hours 15 minutes\par Average usage (total days) 7 hours 45 minutes\par Average usage (days used) 7 hours 45 minutes\par Median usage (days used) 7 hours 29 minutes\par Total used hours (value since last reset - 2023) 7,729 hours\par AirSense 10 AutoSet\par Serial number [id number]\par Mode CPAP\par Set pressure 13 cmH2O\par EPR Fulltime\par EPR level 3\par Therapy\par Leaks - L/min Median: 1.9 95th percentile: 5.0 Maximum: 16.0\par Events per hour AI: 0.4 HI: 0.5 AHI: 0.9\par Apnea Index Central: 0.1 Obstructive: 0.3 Unknown: 0.0\par RERA Index 0.3\par Cheyne-Ricci respiration (average duration per night) 0 minutes (0%).\par \par He is compliant with PAP.\par No mask problems.\par C/o insomnia for many years. Denies daytime somnolence. No naps.\par Able to function well in the daytime\par He was always a short sleeper\par Has difficulties falling asleep\par BT 10 pm- does not feel tired\par SLT 2-3 am\par WT 8 am\par With Benadryl feels groggy\par melatonin makes him groggy in am as well.\par Once he falls asleep he sleeps well\par \par

## 2023-02-21 NOTE — PHYSICAL EXAM
[No Acute Distress] : no acute distress [Normal Appearance] : normal appearance [Normal Rate/Rhythm] : normal rate/rhythm [Normal S1, S2] : normal s1, s2 [No Resp Distress] : no resp distress [Clear to Auscultation Bilaterally] : clear to auscultation bilaterally [No Abnormalities] : no abnormalities [Normal Gait] : normal gait [No Focal Deficits] : no focal deficits [Oriented x3] : oriented x3 [Normal Affect] : normal affect

## 2023-02-27 ENCOUNTER — APPOINTMENT (OUTPATIENT)
Dept: FAMILY MEDICINE | Facility: CLINIC | Age: 63
End: 2023-02-27
Payer: COMMERCIAL

## 2023-02-27 VITALS
HEART RATE: 62 BPM | DIASTOLIC BLOOD PRESSURE: 90 MMHG | OXYGEN SATURATION: 94 % | SYSTOLIC BLOOD PRESSURE: 132 MMHG | HEIGHT: 68 IN | TEMPERATURE: 97.9 F | BODY MASS INDEX: 27.74 KG/M2 | WEIGHT: 183 LBS

## 2023-02-27 DIAGNOSIS — D64.9 ANEMIA, UNSPECIFIED: ICD-10-CM

## 2023-02-27 DIAGNOSIS — R05.9 COUGH, UNSPECIFIED: ICD-10-CM

## 2023-02-27 PROCEDURE — 90471 IMMUNIZATION ADMIN: CPT

## 2023-02-27 PROCEDURE — 99214 OFFICE O/P EST MOD 30 MIN: CPT | Mod: 25

## 2023-02-27 PROCEDURE — 90750 HZV VACC RECOMBINANT IM: CPT

## 2023-02-27 NOTE — HISTORY OF PRESENT ILLNESS
[FreeTextEntry1] : Follow up on medical diagnoses [de-identified] : Pt here for f/u.\meg has been seeing specialists including Dr. Kraus for oncology follow up.  Still has anemia. Wants to recheck levels.\meg Had some workup from nephrology as well.  \par Some constipation.  taking senna and it helps.  Walks up to 8 miles in a day.  Drinks a lot of water.\meg Cut Nadolol down from 80mg to 40mg.  Had been prescribed by his psychiatrist for tremor.  His tremor didn't get worse.  But he was happy to see his pulse go up.\meg Sees eye specialist.\meg Had runny nose recently.  A little transient.  Worries about COVID and requests testing.\meg Wants labs for depakote, uric acid, creatinine. 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

## 2023-02-27 NOTE — ADDENDUM
[FreeTextEntry1] : Patient received the second dose of the shingles vaccine during his visit. Shingrix administered in his left deltoid and tolerated well. Lot # [id number], Ex 08/05/24, NDC: [id number].\par \par Cathie Su RN

## 2023-03-01 LAB
ALBUMIN SERPL ELPH-MCNC: 4.5 G/DL
ALP BLD-CCNC: 41 U/L
ALT SERPL-CCNC: 13 U/L
ANION GAP SERPL CALC-SCNC: 13 MMOL/L
AST SERPL-CCNC: 29 U/L
BASOPHILS # BLD AUTO: 0.03 K/UL
BASOPHILS NFR BLD AUTO: 0.8 %
BILIRUB SERPL-MCNC: 0.7 MG/DL
BUN SERPL-MCNC: 17 MG/DL
CALCIUM SERPL-MCNC: 9.4 MG/DL
CHLORIDE SERPL-SCNC: 97 MMOL/L
CO2 SERPL-SCNC: 24 MMOL/L
CREAT SERPL-MCNC: 1.05 MG/DL
EGFR: 81 ML/MIN/1.73M2
EOSINOPHIL # BLD AUTO: 0.05 K/UL
EOSINOPHIL NFR BLD AUTO: 1.3 %
ESTIMATED AVERAGE GLUCOSE: 128 MG/DL
GLUCOSE SERPL-MCNC: 99 MG/DL
HBA1C MFR BLD HPLC: 6.1 %
HCT VFR BLD CALC: 38.9 %
HGB BLD-MCNC: 12.8 G/DL
IMM GRANULOCYTES NFR BLD AUTO: 0 %
LYMPHOCYTES # BLD AUTO: 1.46 K/UL
LYMPHOCYTES NFR BLD AUTO: 39.4 %
MAN DIFF?: NORMAL
MCHC RBC-ENTMCNC: 31.3 PG
MCHC RBC-ENTMCNC: 32.9 GM/DL
MCV RBC AUTO: 95.1 FL
MONOCYTES # BLD AUTO: 0.32 K/UL
MONOCYTES NFR BLD AUTO: 8.6 %
NEUTROPHILS # BLD AUTO: 1.85 K/UL
NEUTROPHILS NFR BLD AUTO: 49.9 %
PLATELET # BLD AUTO: NORMAL K/UL
POTASSIUM SERPL-SCNC: 4.7 MMOL/L
PROT SERPL-MCNC: 6.6 G/DL
RBC # BLD: 4.09 M/UL
RBC # FLD: 12.7 %
SARS-COV-2 N GENE NPH QL NAA+PROBE: NOT DETECTED
SODIUM SERPL-SCNC: 134 MMOL/L
URATE SERPL-MCNC: 5.4 MG/DL
VALPROATE SERPL-MCNC: 92 UG/ML
WBC # FLD AUTO: 3.71 K/UL

## 2023-06-09 ENCOUNTER — RESULT REVIEW (OUTPATIENT)
Age: 63
End: 2023-06-09

## 2023-06-09 ENCOUNTER — APPOINTMENT (OUTPATIENT)
Dept: HEMATOLOGY ONCOLOGY | Facility: CLINIC | Age: 63
End: 2023-06-09
Payer: COMMERCIAL

## 2023-06-09 VITALS
TEMPERATURE: 95.5 F | DIASTOLIC BLOOD PRESSURE: 87 MMHG | RESPIRATION RATE: 17 BRPM | BODY MASS INDEX: 28.97 KG/M2 | HEART RATE: 61 BPM | OXYGEN SATURATION: 100 % | HEIGHT: 68 IN | WEIGHT: 191.13 LBS | SYSTOLIC BLOOD PRESSURE: 145 MMHG

## 2023-06-09 DIAGNOSIS — K59.00 CONSTIPATION, UNSPECIFIED: ICD-10-CM

## 2023-06-09 PROCEDURE — 99213 OFFICE O/P EST LOW 20 MIN: CPT

## 2023-06-09 RX ORDER — ASPIRIN 81 MG/1
81 TABLET, COATED ORAL
Refills: 0 | Status: ACTIVE | COMMUNITY

## 2023-06-11 PROBLEM — K59.00 CONSTIPATION: Status: ACTIVE | Noted: 2022-09-02

## 2023-06-11 NOTE — HISTORY OF PRESENT ILLNESS
[de-identified] : 63 year old male presents for hospital follow up of pulmonary embolus.  Patient was admitted to [place] 6/3/2020 and discharged 6/17/2020 with a diagnosis of COVID 19.  During admission patient found to have PE, discharged on Eliquis. Now off AC. \par \par Also with history of rectal adenocarcinoma found on CNY - stage 1. Followed up with Dr. Dennis\par \par 8/29/20 - CT chest - significant improvement in Ground glass interstitial peripheral infiltrates with minimal residual hazy granularity in the EMMANUEL and to lesser extent RUL. Findings suggest chronic vs interval residual or prior pneumonitis\par \par CT A/P - no suspicious mass or adenopathy within the A/P. \par \par CEA - 1.6\par \par Path - rectal polyp invasive adenocarcinoma arising in a tubulovillous adenoma, up to 1.5 cm in largest fragment. Moderately differentiated, invades into the submucosa, at the cauterized base of polyp.LVI not identified\par \par CTA Heart - There is an extracardiac mass adjacent to the lateral aspect of the RA and to the lateral and anterior aspect of the SVC. There is no contrast uptake with in the mass. 5.2 x 1.7 x 6.1 cm cystic mass is noted contiguous with the R heart border.\par \par MRI A/P - 10/15/20 - high rectal tumor, T2, No visible mesorectal/superior rectal LN or Extramesorectal LN.\par \par PET CT - small, intensely FDG avid lesion in lesion in the sigmoid colon, likely representing that primary tumor. No evidence of metastatic disease.\par \par s/p laparoscopic robotic LAR with diverticulectomy and loop ileostomy creation  pT2, N0 - stage I\par \par s/p ileostomy closure\par  [de-identified] : Patient seen and examined and here today for follow up for the management of rectal cancer and hx of PE. He no longer is on AC. He presents today feeling overall well. He notes intermittent constipation, bowel movements every other day. He uses prunes/laxatives. He notes normal size, consistency to stools. Denies pencil like thin stools. Denies BRBPR. He is s/p CNY 10/2022 with Dr. Dennis with recommendation to follow up CNY in 3 years. Has follow up appt scheduled 6/28/23. Denies fevers/chills, HA, dizziness, SOB, cough, CP, palpitations, abd pain, n/v/d, swelling to extremities, back pain, hematuria, BRBPR. Of note he also had an incisional hernia repair 7/2022 and denies any abd pain, bulging from incisional sites. \par \par Health Maintenance: \par CNY: 10/2022 follow up 3 years \par PCP: Dr. Burnett appt scheduled 9/11/23 \par Nephro: Dr. Berry, following hyponatremia on salt tabs appt scheduled 6/22/23 \par Pulm: Dr. Sandy appt scheduled 6/28/23\par \par

## 2023-06-11 NOTE — REVIEW OF SYSTEMS
[Muscle Pain] : muscle pain [Negative] : Allergic/Immunologic [Constipation] : constipation [FreeTextEntry9] : foot pain

## 2023-06-11 NOTE — ASSESSMENT
[FreeTextEntry1] : Mr. Nieto is a 63 year old male with history of PE s/p AC and stage 1 rectal adenocarcinoma that presents for routine follow up. \par \par #Rectal Adenocarcinoma \par - Found on CNY \par - Stage 1 \par - Followed up with Dr. Dennis\par - 8/29/20 CT chest significant improvement in Ground glass interstitial peripheral infiltrates with minimal residual hazy granularity in the EMMANUEL and to lesser extent RUL. Findings suggest chronic vs interval residual or prior pneumonitis\par - CT A/P - no suspicious mass or adenopathy within the A/P. \par - CEA - 1.6\par - Reviewed path - rectal polyp invasive adenocarcinoma arising in a tubulovillous adenoma, up to 1.5 cm in largest fragment. Moderately differentiated, invades into the submucosa, at the cauterized base of polyp.LVI not identified\par - 10/9/20 s/p flex sig by Dr. Dennis\par - Discussed at [facility]idisciplinary TB - mass at R heart border picked up by radiology on review of scans due to h/o PE. \par - 8/2019 CTA Heart - There is an extracardiac mass adjacent to the lateral aspect of the RA and to the lateral and anterior aspect of the SVC. There is no contrast uptake with in the mass. 5.2 x 1.7 x 6.1 cm cystic mass is noted contiguous with the R heart border.\par - 10/15/20 MRI A/P high rectal tumor, T2, No visible mesorectal/superior rectal LN or Extramesorectal LN.\par - PET/CT small, intensely FDG avid lesion in lesion in the sigmoid colon, likely representing that primary tumor. No evidence of metastatic disease.\par - s/p laparoscopic robotic LAR with diverticulectomy and loop ileostomy creation  pT2, N0 stage I\par - s/p ileostomy closure\par -10/6/2021reviewed NCCN surveillance guidelines CNY 9/2021 tubular adenoma. Will receive another CNY in 1 year. No need for scans. CEA pending. \par - 1/11/2022- vs and labs reviewed. CEA 0.6 wnl. needs CNY in 9/2022. hgb 12.3, plts 138\par - 5/24/22 - vs and labs reviewed. pending CEA - needs cny in Sept. \par - 9/2/2022 vs and CBC reviewed; repeat CNY with Dr. Dennis pending 10/7/2022; CEA last 1.1\par - 12/9/22 - vs reviewed. labs drawn in office today. hgb 12, plts 125.wbc wnl. kidney function, liver function, electrolytes wnl. CEA pending. Had CNY 10/7/22 with Dr. Dennis normal mucosa in whole colon. continue current care, CNY 3 years 10/ 2025. CEA 1.0 (9/22)\par - 6/9/23 vs and CBC reviewed; WBC 5.15, hgb 13.6, plt 133. s/p CNY 10/22, due in 3 years. Reviewed. Continue follow up with Dr. Dennis, appt scheduled 6/28/23. \par \par #Pericardial Mass \par - Persistent\par - 8/2019 CTA Heart - There is an extracardiac mass adjacent to the lateral aspect of the RA and to the lateral and anterior aspect of the SVC. There is no contrast uptake with in the mass. 5.2 x 1.7 x 6.1 cm cystic mass is noted contiguous with the R heart border.\par - PET/CT non avid right atrial appendage\par \par #Bilateral PE \par - Due to COVID 19\par - Not on O2 any more\par - 8/29/20 - CT chest - significant improvement in Ground glass interstitial peripheral infiltrates with minimal residual hazy granularity in the EMMANUEL and to lesser extent RUL. Findings suggest chronic vs interval residual or prior pneumonitis\par - Continues to follow with Dr. Sandy, appt scheduled 6/28/23. No longer on AC. No s/s of thrombosis. \par \par #Anemia\par - 6/9/23 hgb 13.6 improving, repeat irons/B12 today \par \par #Thrombocytopenia\par - 12/2022 125K monitor\par - 6/9/23 133K. Stable. No s/s of bleeding. Continue to monitor. \par \par #Constipation \par - Controlled with prunes and laxatives PRN. BM every other day, normal in size, color, no BRBPR \par \par #Hyponatremia \par - 6/9/23 [address] Dr. Berry, seeing 6/22/23\par \par #Health Maintenance \par - CNY: 10/2022 follow up 3 years \par - PCP: Dr. Burnett appt scheduled 9/11/23 \par - Nephro: Dr. Berry, following hyponatremia on salt tabs appt scheduled 6/22/23 \par - Pulm: Dr. Sandy appt scheduled 6/28/23\par \par RTC in 6 mos with CBC with diff, CMP, CEA, irons, b12/folate, vit D, ESR/CRP

## 2023-06-11 NOTE — PHYSICAL EXAM
[Fully active, able to carry on all pre-disease performance without restriction] : Status 0 - Fully active, able to carry on all pre-disease performance without restriction [Normal] : affect appropriate [de-identified] : surgical sites well healed without evidence of hernia

## 2023-06-21 ENCOUNTER — RESULT REVIEW (OUTPATIENT)
Age: 63
End: 2023-06-21

## 2023-06-21 ENCOUNTER — APPOINTMENT (OUTPATIENT)
Dept: NEPHROLOGY | Facility: CLINIC | Age: 63
End: 2023-06-21
Payer: COMMERCIAL

## 2023-06-21 VITALS
RESPIRATION RATE: 17 BRPM | OXYGEN SATURATION: 100 % | DIASTOLIC BLOOD PRESSURE: 84 MMHG | SYSTOLIC BLOOD PRESSURE: 144 MMHG | HEART RATE: 60 BPM

## 2023-06-21 PROCEDURE — 36415 COLL VENOUS BLD VENIPUNCTURE: CPT

## 2023-06-22 ENCOUNTER — APPOINTMENT (OUTPATIENT)
Dept: NEPHROLOGY | Facility: CLINIC | Age: 63
End: 2023-06-22
Payer: COMMERCIAL

## 2023-06-22 VITALS — DIASTOLIC BLOOD PRESSURE: 83 MMHG | HEART RATE: 50 BPM | SYSTOLIC BLOOD PRESSURE: 150 MMHG

## 2023-06-22 VITALS
HEART RATE: 57 BPM | DIASTOLIC BLOOD PRESSURE: 80 MMHG | SYSTOLIC BLOOD PRESSURE: 180 MMHG | HEIGHT: 68 IN | OXYGEN SATURATION: 99 % | WEIGHT: 184 LBS | TEMPERATURE: 98.9 F | BODY MASS INDEX: 27.89 KG/M2

## 2023-06-22 PROCEDURE — 99214 OFFICE O/P EST MOD 30 MIN: CPT

## 2023-06-22 NOTE — HISTORY OF PRESENT ILLNESS
[FreeTextEntry1] : 62 yo male from [facility] - here for f/u of TASHA on CKD, with adjustments in BP meds and initiation of allopurinol creatinine has improved to 1.1/1.2 from high of 1.5;\par \par Diagnosed with colon cancer 2020 August, no chemo, stage 1, colon resection, ileostomy, reversed 2/2021, was constipated since, cr improved, weight improved ( lost weight)\par \par Continues to struggle with insomnia, though has MARILUZ and is using CPAP\par \par \par from initial  visit; \par [place] male  with hx of bipolar d/o never on Lithium, HTN ( on 5 meds), hx of syncope ( was seen by cardiology) - creatinine fluctuating from 1.29 to 1.68 over past year - labs also indicate both low and high SNa, raising possibility that his fluid intake varies, furthermore, fasts for his blood tests and may fast for as long as 14 hours\par  \par SHx: denies NSAID No smoking no alcohol\par FHx:no FHx of CKD

## 2023-06-22 NOTE — PHYSICAL EXAM
[General Appearance - Alert] : alert [General Appearance - In No Acute Distress] : in no acute distress [Sclera] : the sclera and conjunctiva were normal [Extraocular Movements] : extraocular movements were intact [Outer Ear] : the ears and nose were normal in appearance [Hearing Threshold Finger Rub Not [place]] : hearing was normal [] : no respiratory distress [Exaggerated Use Of Accessory Muscles For Inspiration] : no accessory muscle use [Apical Impulse] : the apical impulse was normal [Heart Rate And Rhythm] : heart rate was normal and rhythm regular [Bowel Sounds] : normal bowel sounds [Abdomen Soft] : soft [Abnormal Walk] : normal gait [Musculoskeletal - Swelling] : no joint swelling seen [Skin Color & Pigmentation] : normal skin color and pigmentation [Skin Turgor] : normal skin turgor [Cranial Nerves] : cranial nerves 2-12 were intact [No Focal Deficits] : no focal deficits [Oriented To Time, Place, And Person] : oriented to person, place, and time [Impaired Insight] : insight and judgment were intact [Affect] : the affect was normal [Mood] : the mood was normal

## 2023-06-22 NOTE — ASSESSMENT
[FreeTextEntry1] : HTN since age 18, SBP improved, DBP elevated,no hyepraldo, found to have MARILUZ - using CPAP, but cont to have diff sleeping - counseled on low salt diet as this may explain elevated DBP\par \par CKD resolved - cr 1.1 , ckd 3 resolved\par \par Hyponatremia - has been drinking 10+ cups of water/day, advised he reduce it to no more than 6, started on sallt tabs, SNa 145, reduce to one a day - recheck\par \par Hyperuricemia - cont allopurinol, recheck urate level\par \par Constipation - adequate fluid intake, was not present prior to colonic resection and re-asnastamosis - suspect constriction, had colonoscopy,cont otc ex-lax\par \par MARILUZ - admonished to use CPAP - BP improved\par \par Heme note,GI ote, Gsurg note, pulm note reviewed\par BMET, CBC, uric acid reviewed\par \par \par f/u in 2-3 months\par \par \par

## 2023-06-28 ENCOUNTER — APPOINTMENT (OUTPATIENT)
Dept: SURGERY | Facility: CLINIC | Age: 63
End: 2023-06-28
Payer: COMMERCIAL

## 2023-06-28 ENCOUNTER — APPOINTMENT (OUTPATIENT)
Dept: PULMONOLOGY | Facility: CLINIC | Age: 63
End: 2023-06-28
Payer: COMMERCIAL

## 2023-06-28 VITALS
RESPIRATION RATE: 18 BRPM | BODY MASS INDEX: 28.25 KG/M2 | WEIGHT: 186.4 LBS | OXYGEN SATURATION: 100 % | DIASTOLIC BLOOD PRESSURE: 96 MMHG | HEIGHT: 68 IN | SYSTOLIC BLOOD PRESSURE: 178 MMHG | HEART RATE: 61 BPM

## 2023-06-28 VITALS
HEIGHT: 68 IN | OXYGEN SATURATION: 98 % | HEART RATE: 66 BPM | TEMPERATURE: 97.5 F | SYSTOLIC BLOOD PRESSURE: 160 MMHG | BODY MASS INDEX: 27.89 KG/M2 | DIASTOLIC BLOOD PRESSURE: 94 MMHG | WEIGHT: 184 LBS

## 2023-06-28 VITALS — DIASTOLIC BLOOD PRESSURE: 90 MMHG | SYSTOLIC BLOOD PRESSURE: 168 MMHG

## 2023-06-28 DIAGNOSIS — K43.2 INCISIONAL HERNIA W/OUT OBSTRUCTION OR GANGRENE: ICD-10-CM

## 2023-06-28 PROCEDURE — 99213 OFFICE O/P EST LOW 20 MIN: CPT

## 2023-06-28 RX ORDER — CHOLECALCIFEROL (VITAMIN D3) 250 MCG
250 MCG CAPSULE ORAL
Refills: 0 | Status: DISCONTINUED | COMMUNITY
End: 2023-06-28

## 2023-06-28 RX ORDER — LACTOBACILLUS ACIDOPHILUS/PECT 30 MG-20MG
TABLET ORAL
Refills: 0 | Status: DISCONTINUED | COMMUNITY
End: 2023-06-28

## 2023-06-28 NOTE — PHYSICAL EXAM
[General Appearance - Well Developed] : well developed [Normal Conjunctiva] : the conjunctiva exhibited no abnormalities [Heart Sounds] : normal S1 and S2 [Exaggerated Use Of Accessory Muscles For Inspiration] : no accessory muscle use [Auscultation Breath Sounds / Voice Sounds] : lungs were clear to auscultation bilaterally [Nail Clubbing] : no clubbing  or cyanosis of the fingernails [No Focal Deficits] : no focal deficits [Oriented To Time, Place, And Person] : oriented to person, place, and time [Impaired Insight] : insight and judgment were intact [Affect] : the affect was normal

## 2023-06-28 NOTE — HISTORY OF PRESENT ILLNESS
[FreeTextEntry1] : 63 year old male with MARILUZ came for f/u.\par Last seen in February 2023\par Compliance report:\par Usage 100%\par Average use 8 hrs 9 min\par Leak 5.9\par AHI 1.2\par \par Sleeps well with PAP.\par No mask problems\par Sleeps 5-6 hrs per night. No daytime somnolence.\par No naps\par He is concerned about his BP. We discussed that treating MARILUZ with PAP will only result in a mild decrease in BP. According to his compliance report his MARILUZ is well treated and does not need any adjustments\par \par \par

## 2023-07-03 ENCOUNTER — RX RENEWAL (OUTPATIENT)
Age: 63
End: 2023-07-03

## 2023-07-10 ENCOUNTER — RESULT REVIEW (OUTPATIENT)
Age: 63
End: 2023-07-10

## 2023-07-10 ENCOUNTER — APPOINTMENT (OUTPATIENT)
Dept: NEPHROLOGY | Facility: CLINIC | Age: 63
End: 2023-07-10
Payer: COMMERCIAL

## 2023-07-10 VITALS
HEART RATE: 63 BPM | DIASTOLIC BLOOD PRESSURE: 87 MMHG | RESPIRATION RATE: 18 BRPM | OXYGEN SATURATION: 100 % | SYSTOLIC BLOOD PRESSURE: 141 MMHG

## 2023-07-10 PROCEDURE — 99212 OFFICE O/P EST SF 10 MIN: CPT | Mod: 25

## 2023-07-10 PROCEDURE — 36415 COLL VENOUS BLD VENIPUNCTURE: CPT

## 2023-07-21 PROBLEM — K43.2 INCISIONAL HERNIA, WITHOUT OBSTRUCTION OR GANGRENE: Status: ACTIVE | Noted: 2022-05-03

## 2023-07-21 NOTE — ASSESSMENT
[FreeTextEntry1] : 63-year-old male here for follow-up visit after robotic assisted incisional hernia repair.  Patient doing well in follow-up.\par \par No concern for hernia at this time.  Patient is doing well.  Can continue with regular activities without restrictions.  We will plan for follow-up in 1 year.

## 2023-07-21 NOTE — PHYSICAL EXAM
[Abdomen Masses] : No abdominal masses [Abdomen Tenderness] : ~T No ~M abdominal tenderness [JVD] : no jugular venous distention  [Respiratory Effort] : normal respiratory effort [No Rash or Lesion] : No rash or lesion [Calm] : calm [Alert] : alert [de-identified] : Well-healed scars no appreciable hernia. [de-identified] : No acute distress

## 2023-07-21 NOTE — HISTORY OF PRESENT ILLNESS
[FreeTextEntry1] : 63-year-old male here forfollow-up visit after robotic assisted incisional hernia repair.  Patient underwent robotic assisted incisional hernia repair with retrorectus placement of mesh including a right-sided transversus abdominis release.  \par \par Doing well since last visit.  Continues to eat and drink without issue.  Denies any nausea vomiting or fevers.  No concerns regarding hernia recurrence.  Continues to have some constipation but is managed with fiber supplements and prune juice.  Patient due for colonoscopy in 2025.  No concerns at this time.

## 2023-09-11 ENCOUNTER — APPOINTMENT (OUTPATIENT)
Dept: FAMILY MEDICINE | Facility: CLINIC | Age: 63
End: 2023-09-11
Payer: COMMERCIAL

## 2023-09-11 VITALS
DIASTOLIC BLOOD PRESSURE: 100 MMHG | SYSTOLIC BLOOD PRESSURE: 158 MMHG | BODY MASS INDEX: 27.43 KG/M2 | HEIGHT: 68 IN | WEIGHT: 181 LBS | HEART RATE: 62 BPM | OXYGEN SATURATION: 99 %

## 2023-09-11 DIAGNOSIS — E53.8 DEFICIENCY OF OTHER SPECIFIED B GROUP VITAMINS: ICD-10-CM

## 2023-09-11 PROCEDURE — 36415 COLL VENOUS BLD VENIPUNCTURE: CPT

## 2023-09-11 PROCEDURE — 99215 OFFICE O/P EST HI 40 MIN: CPT | Mod: 25

## 2023-09-13 LAB
ALBUMIN SERPL ELPH-MCNC: 4.8 G/DL
ALP BLD-CCNC: 42 U/L
ALT SERPL-CCNC: 11 U/L
ANION GAP SERPL CALC-SCNC: 11 MMOL/L
AST SERPL-CCNC: 20 U/L
BILIRUB SERPL-MCNC: 0.6 MG/DL
BUN SERPL-MCNC: 17 MG/DL
CALCIUM SERPL-MCNC: 9.8 MG/DL
CHLORIDE SERPL-SCNC: 100 MMOL/L
CHOLEST SERPL-MCNC: 134 MG/DL
CO2 SERPL-SCNC: 27 MMOL/L
CREAT SERPL-MCNC: 1.05 MG/DL
EGFR: 81 ML/MIN/1.73M2
ESTIMATED AVERAGE GLUCOSE: 128 MG/DL
GLUCOSE SERPL-MCNC: 115 MG/DL
HBA1C MFR BLD HPLC: 6.1 %
HCT VFR BLD CALC: 41.3 %
HDLC SERPL-MCNC: 57 MG/DL
HGB BLD-MCNC: 13.1 G/DL
LDLC SERPL CALC-MCNC: 66 MG/DL
MCHC RBC-ENTMCNC: 31.2 PG
MCHC RBC-ENTMCNC: 31.7 GM/DL
MCV RBC AUTO: 98.3 FL
NONHDLC SERPL-MCNC: 77 MG/DL
PLATELET # BLD AUTO: 105 K/UL
POTASSIUM SERPL-SCNC: 4.8 MMOL/L
PROT SERPL-MCNC: 7.2 G/DL
RBC # BLD: 4.2 M/UL
RBC # FLD: 13.3 %
SODIUM SERPL-SCNC: 138 MMOL/L
TRIGL SERPL-MCNC: 47 MG/DL
URATE SERPL-MCNC: 5.7 MG/DL
VALPROATE SERPL-MCNC: 90 UG/ML
VIT B12 SERPL-MCNC: 1449 PG/ML
WBC # FLD AUTO: 3.78 K/UL

## 2023-09-13 RX ORDER — NADOLOL 80 MG/1
80 TABLET ORAL
Qty: 90 | Refills: 3 | Status: ACTIVE | COMMUNITY
Start: 2019-10-31 | End: 1900-01-01

## 2023-09-13 RX ORDER — ATORVASTATIN CALCIUM 40 MG/1
40 TABLET, FILM COATED ORAL
Qty: 90 | Refills: 3 | Status: ACTIVE | COMMUNITY
Start: 2019-09-09 | End: 1900-01-01

## 2023-09-13 RX ORDER — LISINOPRIL 20 MG/1
20 TABLET ORAL DAILY
Qty: 180 | Refills: 3 | Status: ACTIVE | COMMUNITY
Start: 2019-08-15 | End: 1900-01-01

## 2023-10-02 ENCOUNTER — NON-APPOINTMENT (OUTPATIENT)
Age: 63
End: 2023-10-02

## 2023-10-02 ENCOUNTER — APPOINTMENT (OUTPATIENT)
Dept: CARDIOLOGY | Facility: CLINIC | Age: 63
End: 2023-10-02
Payer: COMMERCIAL

## 2023-10-02 VITALS
DIASTOLIC BLOOD PRESSURE: 90 MMHG | SYSTOLIC BLOOD PRESSURE: 160 MMHG | HEIGHT: 68 IN | OXYGEN SATURATION: 100 % | WEIGHT: 185 LBS | BODY MASS INDEX: 28.04 KG/M2 | HEART RATE: 71 BPM

## 2023-10-02 DIAGNOSIS — Q24.8 OTHER SPECIFIED CONGENITAL MALFORMATIONS OF HEART: ICD-10-CM

## 2023-10-02 PROCEDURE — 99204 OFFICE O/P NEW MOD 45 MIN: CPT | Mod: 25

## 2023-10-02 PROCEDURE — 93000 ELECTROCARDIOGRAM COMPLETE: CPT

## 2023-10-20 ENCOUNTER — APPOINTMENT (OUTPATIENT)
Dept: CARDIOLOGY | Facility: CLINIC | Age: 63
End: 2023-10-20
Payer: COMMERCIAL

## 2023-10-20 PROCEDURE — 93306 TTE W/DOPPLER COMPLETE: CPT

## 2023-10-20 PROCEDURE — 36415 COLL VENOUS BLD VENIPUNCTURE: CPT

## 2023-10-21 DIAGNOSIS — Z86.79 PERSONAL HISTORY OF OTHER DISEASES OF THE CIRCULATORY SYSTEM: ICD-10-CM

## 2023-10-21 DIAGNOSIS — E87.5 HYPERKALEMIA: ICD-10-CM

## 2023-10-22 LAB
ANION GAP SERPL CALC-SCNC: 10 MMOL/L
BUN SERPL-MCNC: 15 MG/DL
CALCIUM SERPL-MCNC: 9.9 MG/DL
CHLORIDE SERPL-SCNC: 93 MMOL/L
CO2 SERPL-SCNC: 26 MMOL/L
CREAT SERPL-MCNC: 1.26 MG/DL
EGFR: 65 ML/MIN/1.73M2
GLUCOSE SERPL-MCNC: 104 MG/DL
HCT VFR BLD CALC: 40.2 %
HGB BLD-MCNC: 13.3 G/DL
MCHC RBC-ENTMCNC: 31.4 PG
MCHC RBC-ENTMCNC: 33.1 GM/DL
MCV RBC AUTO: 94.8 FL
PLATELET # BLD AUTO: 127 K/UL
POTASSIUM SERPL-SCNC: 6.4 MMOL/L
RBC # BLD: 4.24 M/UL
RBC # FLD: 12.9 %
SODIUM SERPL-SCNC: 129 MMOL/L
WBC # FLD AUTO: 7.96 K/UL

## 2023-10-23 ENCOUNTER — APPOINTMENT (OUTPATIENT)
Dept: CARDIOLOGY | Facility: CLINIC | Age: 63
End: 2023-10-23
Payer: COMMERCIAL

## 2023-10-23 PROCEDURE — 36415 COLL VENOUS BLD VENIPUNCTURE: CPT

## 2023-10-24 ENCOUNTER — NON-APPOINTMENT (OUTPATIENT)
Age: 63
End: 2023-10-24

## 2023-10-24 LAB
ANION GAP SERPL CALC-SCNC: 17 MMOL/L
BUN SERPL-MCNC: 14 MG/DL
CALCIUM SERPL-MCNC: 9.6 MG/DL
CHLORIDE SERPL-SCNC: 101 MMOL/L
CO2 SERPL-SCNC: 22 MMOL/L
CREAT SERPL-MCNC: 1.18 MG/DL
EGFR: 70 ML/MIN/1.73M2
GLUCOSE SERPL-MCNC: 101 MG/DL
POTASSIUM SERPL-SCNC: 5.2 MMOL/L
SODIUM SERPL-SCNC: 140 MMOL/L

## 2023-11-15 ENCOUNTER — APPOINTMENT (OUTPATIENT)
Dept: CARDIOLOGY | Facility: CLINIC | Age: 63
End: 2023-11-15
Payer: COMMERCIAL

## 2023-11-15 VITALS — DIASTOLIC BLOOD PRESSURE: 93 MMHG | SYSTOLIC BLOOD PRESSURE: 151 MMHG

## 2023-11-15 VITALS
DIASTOLIC BLOOD PRESSURE: 94 MMHG | OXYGEN SATURATION: 100 % | SYSTOLIC BLOOD PRESSURE: 166 MMHG | WEIGHT: 187 LBS | HEART RATE: 63 BPM | BODY MASS INDEX: 28.34 KG/M2 | HEIGHT: 68 IN

## 2023-11-15 DIAGNOSIS — E66.3 OVERWEIGHT: ICD-10-CM

## 2023-11-15 DIAGNOSIS — I38 ENDOCARDITIS, VALVE UNSPECIFIED: ICD-10-CM

## 2023-11-15 DIAGNOSIS — Z86.39 PERSONAL HISTORY OF OTHER ENDOCRINE, NUTRITIONAL AND METABOLIC DISEASE: ICD-10-CM

## 2023-11-15 PROCEDURE — 99214 OFFICE O/P EST MOD 30 MIN: CPT

## 2023-11-26 PROBLEM — I38 VALVULAR HEART DISEASE: Status: ACTIVE | Noted: 2023-11-26

## 2023-11-26 PROBLEM — E66.3 OVERWEIGHT: Status: ACTIVE | Noted: 2023-10-13

## 2023-11-26 PROBLEM — Z86.39 HISTORY OF HYPERLIPIDEMIA: Status: RESOLVED | Noted: 2023-11-26 | Resolved: 2023-11-26

## 2023-12-05 ENCOUNTER — APPOINTMENT (OUTPATIENT)
Dept: PULMONOLOGY | Facility: CLINIC | Age: 63
End: 2023-12-05
Payer: COMMERCIAL

## 2023-12-05 VITALS
TEMPERATURE: 96.2 F | BODY MASS INDEX: 28.34 KG/M2 | WEIGHT: 187 LBS | OXYGEN SATURATION: 97 % | HEIGHT: 68 IN | SYSTOLIC BLOOD PRESSURE: 172 MMHG | DIASTOLIC BLOOD PRESSURE: 89 MMHG | HEART RATE: 69 BPM

## 2023-12-05 PROCEDURE — 99213 OFFICE O/P EST LOW 20 MIN: CPT

## 2023-12-11 ENCOUNTER — RESULT REVIEW (OUTPATIENT)
Age: 63
End: 2023-12-11

## 2023-12-11 ENCOUNTER — APPOINTMENT (OUTPATIENT)
Dept: HEMATOLOGY ONCOLOGY | Facility: CLINIC | Age: 63
End: 2023-12-11
Payer: COMMERCIAL

## 2023-12-11 VITALS
OXYGEN SATURATION: 97 % | RESPIRATION RATE: 16 BRPM | SYSTOLIC BLOOD PRESSURE: 158 MMHG | BODY MASS INDEX: 29.29 KG/M2 | WEIGHT: 193.25 LBS | DIASTOLIC BLOOD PRESSURE: 93 MMHG | TEMPERATURE: 97.8 F | HEIGHT: 68 IN | HEART RATE: 62 BPM

## 2023-12-11 VITALS — DIASTOLIC BLOOD PRESSURE: 90 MMHG | SYSTOLIC BLOOD PRESSURE: 150 MMHG

## 2023-12-11 DIAGNOSIS — D69.6 THROMBOCYTOPENIA, UNSPECIFIED: ICD-10-CM

## 2023-12-11 PROCEDURE — 99214 OFFICE O/P EST MOD 30 MIN: CPT

## 2023-12-11 RX ORDER — HYDRALAZINE HYDROCHLORIDE 50 MG/1
TABLET ORAL
Refills: 0 | Status: COMPLETED | COMMUNITY
End: 2023-12-11

## 2023-12-11 RX ORDER — SENNOSIDES 15 MG/1
TABLET, CHEWABLE ORAL
Refills: 0 | Status: ACTIVE | COMMUNITY

## 2023-12-11 RX ORDER — HYDRALAZINE HYDROCHLORIDE 25 MG/1
25 TABLET ORAL
Qty: 180 | Refills: 3 | Status: COMPLETED | COMMUNITY
Start: 2021-07-27 | End: 2023-12-11

## 2023-12-11 RX ORDER — CHROMIUM 200 MCG
25 MCG TABLET ORAL
Refills: 0 | Status: ACTIVE | COMMUNITY

## 2024-01-05 ENCOUNTER — APPOINTMENT (OUTPATIENT)
Dept: NEPHROLOGY | Facility: CLINIC | Age: 64
End: 2024-01-05
Payer: COMMERCIAL

## 2024-01-05 VITALS
HEIGHT: 68 IN | HEART RATE: 63 BPM | BODY MASS INDEX: 28.49 KG/M2 | WEIGHT: 188 LBS | OXYGEN SATURATION: 100 % | SYSTOLIC BLOOD PRESSURE: 130 MMHG | DIASTOLIC BLOOD PRESSURE: 80 MMHG

## 2024-01-05 PROCEDURE — 99215 OFFICE O/P EST HI 40 MIN: CPT

## 2024-01-05 RX ORDER — HYDRALAZINE HYDROCHLORIDE 50 MG/1
50 TABLET ORAL
Qty: 270 | Refills: 1 | Status: ACTIVE | COMMUNITY
Start: 2023-12-01 | End: 1900-01-01

## 2024-01-05 NOTE — ASSESSMENT
[FreeTextEntry1] : resistant HTN since age 18, SBP improved, DBP elevated,no hyepraldo, found to have MARILUZ - using CPAP, but cont to have diff sleeping - counseled on low salt diet as this may explain elevated DBP - incr hydralazine to 50qam/25qpm/50mg qpm    CKD resolved - cr 1.1 , ckd 3 resolved    Hyponatremia - has been drinking 10+ cups of water/day, advised he reduce it to no more than 6, started on sallt tabs, SNa 145, reduce to one a day - recheck    Hyperuricemia - cont allopurinol, recheck urate level    Constipation - adequate fluid intake, was not present prior to colonic resection and re-asnastamosis - suspect constriction, had colonoscopy,cont otc ex-lax    MARILUZ - admonished to use CPAP - BP improved    Heme note,GI ote, Gsurg note, pulm note reviewed

## 2024-01-05 NOTE — HISTORY OF PRESENT ILLNESS
[FreeTextEntry1] : 64 yo male from [facility] - here for f/u of TASHA on CKD, with adjustments in BP meds and initiation of allopurinol creatinine has improved to 1.1/1.2 from high of 1.5;\par  \par  Diagnosed with colon cancer 2020 August, no chemo, stage 1, colon resection, ileostomy, reversed 2/2021, was constipated since, cr improved, weight improved ( lost weight)\par  \par  Continues to struggle with insomnia, though has MARILUZ and is using CPAP\par  \par  \par  from initial  visit; \par  [place] male  with hx of bipolar d/o never on Lithium, HTN ( on 5 meds), hx of syncope ( was seen by cardiology) - creatinine fluctuating from 1.29 to 1.68 over past year - labs also indicate both low and high SNa, raising possibility that his fluid intake varies, furthermore, fasts for his blood tests and may fast for as long as 14 hours\par   \par  SHx: denies NSAID No smoking no alcohol\par  FHx:no FHx of CKD

## 2024-01-05 NOTE — PHYSICAL EXAM
[General Appearance - Alert] : alert [General Appearance - In No Acute Distress] : in no acute distress [Sclera] : the sclera and conjunctiva were normal [Extraocular Movements] : extraocular movements were intact [Outer Ear] : the ears and nose were normal in appearance [Hearing Threshold Finger Rub Not [place]] : hearing was normal [] : no respiratory distress [Exaggerated Use Of Accessory Muscles For Inspiration] : no accessory muscle use [Apical Impulse] : the apical impulse was normal [Heart Rate And Rhythm] : heart rate was normal and rhythm regular [Bowel Sounds] : normal bowel sounds [Abdomen Soft] : soft [Abnormal Walk] : normal gait [Musculoskeletal - Swelling] : no joint swelling seen [Skin Turgor] : normal skin turgor [Skin Color & Pigmentation] : normal skin color and pigmentation [Cranial Nerves] : cranial nerves 2-12 were intact [No Focal Deficits] : no focal deficits [Oriented To Time, Place, And Person] : oriented to person, place, and time [Impaired Insight] : insight and judgment were intact [Affect] : the affect was normal [Mood] : the mood was normal

## 2024-01-19 ENCOUNTER — RX RENEWAL (OUTPATIENT)
Age: 64
End: 2024-01-19

## 2024-01-19 RX ORDER — ALLOPURINOL 100 MG/1
100 TABLET ORAL
Qty: 180 | Refills: 0 | Status: ACTIVE | COMMUNITY
Start: 2019-11-12 | End: 1900-01-01

## 2024-01-25 ENCOUNTER — APPOINTMENT (OUTPATIENT)
Dept: NEPHROLOGY | Facility: CLINIC | Age: 64
End: 2024-01-25
Payer: COMMERCIAL

## 2024-01-25 PROCEDURE — 93784 AMBL BP MNTR W/SOFTWARE: CPT

## 2024-02-14 ENCOUNTER — RX RENEWAL (OUTPATIENT)
Age: 64
End: 2024-02-14

## 2024-02-14 RX ORDER — AMLODIPINE BESYLATE 10 MG/1
10 TABLET ORAL
Qty: 90 | Refills: 3 | Status: ACTIVE | COMMUNITY
Start: 2019-05-13 | End: 1900-01-01

## 2024-03-18 ENCOUNTER — APPOINTMENT (OUTPATIENT)
Dept: FAMILY MEDICINE | Facility: CLINIC | Age: 64
End: 2024-03-18
Payer: COMMERCIAL

## 2024-03-18 VITALS — HEART RATE: 64 BPM | WEIGHT: 189 LBS | HEIGHT: 68 IN | BODY MASS INDEX: 28.64 KG/M2 | OXYGEN SATURATION: 99 %

## 2024-03-18 VITALS — SYSTOLIC BLOOD PRESSURE: 148 MMHG | DIASTOLIC BLOOD PRESSURE: 92 MMHG

## 2024-03-18 DIAGNOSIS — Z00.00 ENCOUNTER FOR GENERAL ADULT MEDICAL EXAMINATION W/OUT ABNORMAL FINDINGS: ICD-10-CM

## 2024-03-18 DIAGNOSIS — E79.0 HYPERURICEMIA W/OUT SIGNS OF INFLAMMATORY ARTHRITIS AND TOPHACEOUS DISEASE: ICD-10-CM

## 2024-03-18 DIAGNOSIS — E78.2 MIXED HYPERLIPIDEMIA: ICD-10-CM

## 2024-03-18 DIAGNOSIS — R73.03 PREDIABETES.: ICD-10-CM

## 2024-03-18 DIAGNOSIS — C20 MALIGNANT NEOPLASM OF RECTUM: ICD-10-CM

## 2024-03-18 PROCEDURE — 36415 COLL VENOUS BLD VENIPUNCTURE: CPT

## 2024-03-18 PROCEDURE — 99396 PREV VISIT EST AGE 40-64: CPT

## 2024-03-18 NOTE — HISTORY OF PRESENT ILLNESS
[FreeTextEntry1] : Pt here for annual wellness visit [de-identified] : Pt here for annual wellness visit. c/o frequent urination.  More in the day.  Less in the night.  No dysuria.  No blood or mucus in the urine.  Drinks a lot of fluids and that tracks with his urination.  Worries about urinary frequency being a symptom of prostate cancer.  Denies any blood in semen or ejaculatory pain.   Only takes allopurinol about 2 times a week because he thinks it makes him urinate. Dr. Berry:  nephrologist.  Increased hydralazine from 2 to 3 times a day. Dr. Joiner: cardiology.  Normal testing.  Reportedly started anna for BP but got hyperkalemia.  Stopped it. Dr. Sandy.  Pulm.  For lungs and sleep apnea. Dr. Kraus. Hematologist.  Blood and cancer.  Had GI cancer and gets surveillance. Colonoscopy next in 2025 due to h/o cancer. Got new flu shot.  Didn't get most recent COVID shot.

## 2024-03-18 NOTE — PHYSICAL EXAM
[No Acute Distress] : no acute distress [Well Nourished] : well nourished [Well Developed] : well developed [Well-Appearing] : well-appearing [Normal Sclera/Conjunctiva] : normal sclera/conjunctiva [PERRL] : pupils equal round and reactive to light [EOMI] : extraocular movements intact [Normal Outer Ear/Nose] : the outer ears and nose were normal in appearance [Normal Oropharynx] : the oropharynx was normal [No Lymphadenopathy] : no lymphadenopathy [No JVD] : no jugular venous distention [Thyroid Normal, No Nodules] : the thyroid was normal and there were no nodules present [Supple] : supple [No Accessory Muscle Use] : no accessory muscle use [No Respiratory Distress] : no respiratory distress  [Clear to Auscultation] : lungs were clear to auscultation bilaterally [Normal Rate] : normal rate  [Regular Rhythm] : with a regular rhythm [Normal S1, S2] : normal S1 and S2 [No Murmur] : no murmur heard [No Abdominal Bruit] : a ~M bruit was not heard ~T in the abdomen [No Carotid Bruits] : no carotid bruits [No Varicosities] : no varicosities [No Edema] : there was no peripheral edema [Pedal Pulses Present] : the pedal pulses are present [No Palpable Aorta] : no palpable aorta [No Extremity Clubbing/Cyanosis] : no extremity clubbing/cyanosis [Soft] : abdomen soft [Non Tender] : non-tender [Non-distended] : non-distended [No Masses] : no abdominal mass palpated [No HSM] : no HSM [Normal Bowel Sounds] : normal bowel sounds [No CVA Tenderness] : no CVA  tenderness [Normal Posterior Cervical Nodes] : no posterior cervical lymphadenopathy [Normal Anterior Cervical Nodes] : no anterior cervical lymphadenopathy [No Spinal Tenderness] : no spinal tenderness [No Joint Swelling] : no joint swelling [Grossly Normal Strength/Tone] : grossly normal strength/tone [No Rash] : no rash [Coordination Grossly Intact] : coordination grossly intact [Normal Gait] : normal gait [No Focal Deficits] : no focal deficits [Deep Tendon Reflexes (DTR)] : deep tendon reflexes were 2+ and symmetric [Normal Affect] : the affect was normal [Normal Insight/Judgement] : insight and judgment were intact

## 2024-03-21 LAB
ALBUMIN SERPL ELPH-MCNC: 4.7 G/DL
ALP BLD-CCNC: 46 U/L
ALT SERPL-CCNC: 14 U/L
ANION GAP SERPL CALC-SCNC: 13 MMOL/L
APPEARANCE: CLEAR
AST SERPL-CCNC: 26 U/L
BILIRUB SERPL-MCNC: 0.6 MG/DL
BILIRUBIN URINE: NEGATIVE
BLOOD URINE: NEGATIVE
BUN SERPL-MCNC: 18 MG/DL
CALCIUM SERPL-MCNC: 9.9 MG/DL
CHLORIDE SERPL-SCNC: 95 MMOL/L
CHOLEST SERPL-MCNC: 143 MG/DL
CO2 SERPL-SCNC: 25 MMOL/L
COLOR: YELLOW
CREAT SERPL-MCNC: 1.34 MG/DL
EGFR: 60 ML/MIN/1.73M2
ESTIMATED AVERAGE GLUCOSE: 131 MG/DL
GLUCOSE QUALITATIVE U: NEGATIVE MG/DL
GLUCOSE SERPL-MCNC: 104 MG/DL
HBA1C MFR BLD HPLC: 6.2 %
HDLC SERPL-MCNC: 56 MG/DL
KETONES URINE: NEGATIVE MG/DL
LDLC SERPL CALC-MCNC: 74 MG/DL
LEUKOCYTE ESTERASE URINE: NEGATIVE
NITRITE URINE: NEGATIVE
NONHDLC SERPL-MCNC: 87 MG/DL
PH URINE: 6.5
POTASSIUM SERPL-SCNC: 5 MMOL/L
PROT SERPL-MCNC: 7.3 G/DL
PROTEIN URINE: NEGATIVE MG/DL
PSA SERPL-MCNC: 0.67 NG/ML
SODIUM SERPL-SCNC: 133 MMOL/L
SPECIFIC GRAVITY URINE: 1.01
TRIGL SERPL-MCNC: 60 MG/DL
URATE SERPL-MCNC: 6.7 MG/DL
UROBILINOGEN URINE: 0.2 MG/DL
VALPROATE SERPL-MCNC: 103 UG/ML

## 2024-04-10 ENCOUNTER — RESULT REVIEW (OUTPATIENT)
Age: 64
End: 2024-04-10

## 2024-04-10 ENCOUNTER — APPOINTMENT (OUTPATIENT)
Dept: NEPHROLOGY | Facility: CLINIC | Age: 64
End: 2024-04-10
Payer: COMMERCIAL

## 2024-04-10 VITALS
OXYGEN SATURATION: 99 % | WEIGHT: 192 LBS | BODY MASS INDEX: 29.1 KG/M2 | HEIGHT: 68 IN | DIASTOLIC BLOOD PRESSURE: 80 MMHG | SYSTOLIC BLOOD PRESSURE: 150 MMHG | HEART RATE: 63 BPM

## 2024-04-10 DIAGNOSIS — I10 ESSENTIAL (PRIMARY) HYPERTENSION: ICD-10-CM

## 2024-04-10 DIAGNOSIS — R35.0 FREQUENCY OF MICTURITION: ICD-10-CM

## 2024-04-10 PROCEDURE — 99214 OFFICE O/P EST MOD 30 MIN: CPT

## 2024-04-10 RX ORDER — PRAZOSIN HYDROCHLORIDE 1 MG/1
1 CAPSULE ORAL
Qty: 180 | Refills: 1 | Status: ACTIVE | COMMUNITY
Start: 2024-04-10 | End: 1900-01-01

## 2024-04-10 NOTE — HISTORY OF PRESENT ILLNESS
[FreeTextEntry1] : 64 yo male from [facility] - here for f/u of TASHA on CKD, with adjustments in BP meds and initiation of allopurinol creatinine has improved to 1.1/1.2 from high of 1.5; Diagnosed with colon cancer 2020 August, no chemo, stage 1, colon resection, ileostomy, reversed 2/2021, was constipated since, cr improved, weight improved ( lost weight) Continues to struggle with insomnia, though has MARILUZ and is using CPAP   from initial  visit;  [facility] male  with hx of bipolar d/o never on Lithium, HTN ( on 5 meds), hx of syncope ( was seen by cardiology) - creatinine fluctuating from 1.29 to 1.68 over past year - labs also indicate both low and high SNa, raising possibility that his fluid intake varies, furthermore, fasts for his blood tests and may fast for as long as 14 hours   SHx: denies NSAID No smoking no alcohol FHx:no FHx of CKD

## 2024-04-10 NOTE — ASSESSMENT
[FreeTextEntry1] : resistant HTN since age 18, SBP improved, DBP elevated,no hyepraldo, found to have MARILUZ - using CPAP, but cont to have diff sleeping - counseled on low salt diet - has had hyponatremia, so hctz not an option - cont hydralazine to 50qam/50qpm/50mg qpm - cont amlodipine 10 - cont lisinopril 40 - cont nadolol 80 -  start prazosin 1mg bid    CKD resolved - cr 1.1 , ckd 3 resolved Hyponatremia - has been drinking 10+ cups of water/day, advised he reduce it to no more than 6, started on sallt tabs, SNa 145, reduce to one a day - recheck   Hyperuricemia  - cont allopurinol, recheck urate level   Constipation  - adequate fluid intake, was not present prior to colonic resection and re-asnastamosis - suspect constriction, had colonoscopy,cont otc ex-lax   MARILUZ  - admonished to use CPAP - BP improved    Heme note,GI ote, Gsurg note, pulm note reviewed

## 2024-04-10 NOTE — PHYSICAL EXAM
[General Appearance - Alert] : alert [General Appearance - In No Acute Distress] : in no acute distress [Sclera] : the sclera and conjunctiva were normal [Extraocular Movements] : extraocular movements were intact [Outer Ear] : the ears and nose were normal in appearance [Hearing Threshold Finger Rub Not [place]] : hearing was normal [] : no respiratory distress [Exaggerated Use Of Accessory Muscles For Inspiration] : no accessory muscle use [Apical Impulse] : the apical impulse was normal [Heart Rate And Rhythm] : heart rate was normal and rhythm regular [Bowel Sounds] : normal bowel sounds [Abdomen Soft] : soft [Abnormal Walk] : normal gait [Musculoskeletal - Swelling] : no joint swelling seen [Skin Color & Pigmentation] : normal skin color and pigmentation [Skin Turgor] : normal skin turgor [Cranial Nerves] : cranial nerves 2-12 were intact [No Focal Deficits] : no focal deficits [Oriented To Time, Place, And Person] : oriented to person, place, and time [Impaired Insight] : insight and judgment were intact [Affect] : the affect was normal [Mood] : the mood was normal

## 2024-04-15 ENCOUNTER — APPOINTMENT (OUTPATIENT)
Dept: NEPHROLOGY | Facility: CLINIC | Age: 64
End: 2024-04-15
Payer: COMMERCIAL

## 2024-04-15 ENCOUNTER — RESULT REVIEW (OUTPATIENT)
Age: 64
End: 2024-04-15

## 2024-04-15 DIAGNOSIS — E87.1 HYPO-OSMOLALITY AND HYPONATREMIA: ICD-10-CM

## 2024-04-15 PROCEDURE — 36415 COLL VENOUS BLD VENIPUNCTURE: CPT

## 2024-04-22 ENCOUNTER — RX RENEWAL (OUTPATIENT)
Age: 64
End: 2024-04-22

## 2024-04-22 RX ORDER — NORMAL SALT TABLETS 1 G/G
1 TABLET ORAL
Qty: 189 | Refills: 3 | Status: ACTIVE | COMMUNITY
Start: 2023-06-14 | End: 1900-01-01

## 2024-05-06 ENCOUNTER — APPOINTMENT (OUTPATIENT)
Dept: NEPHROLOGY | Facility: CLINIC | Age: 64
End: 2024-05-06
Payer: COMMERCIAL

## 2024-05-06 PROCEDURE — ZZZZZ: CPT

## 2024-05-14 ENCOUNTER — APPOINTMENT (OUTPATIENT)
Dept: NEPHROLOGY | Facility: CLINIC | Age: 64
End: 2024-05-14
Payer: COMMERCIAL

## 2024-05-14 DIAGNOSIS — I10 ESSENTIAL (PRIMARY) HYPERTENSION: ICD-10-CM

## 2024-05-14 PROCEDURE — ZZZZZ: CPT

## 2024-06-06 ENCOUNTER — APPOINTMENT (OUTPATIENT)
Dept: PULMONOLOGY | Facility: CLINIC | Age: 64
End: 2024-06-06
Payer: COMMERCIAL

## 2024-06-06 VITALS
DIASTOLIC BLOOD PRESSURE: 87 MMHG | HEART RATE: 75 BPM | BODY MASS INDEX: 29.1 KG/M2 | OXYGEN SATURATION: 99 % | HEIGHT: 68 IN | SYSTOLIC BLOOD PRESSURE: 166 MMHG | TEMPERATURE: 97 F | WEIGHT: 192 LBS

## 2024-06-06 DIAGNOSIS — G47.21 CIRCADIAN RHYTHM SLEEP DISORDER, DELAYED SLEEP PHASE TYPE: ICD-10-CM

## 2024-06-06 DIAGNOSIS — G47.33 OBSTRUCTIVE SLEEP APNEA (ADULT) (PEDIATRIC): ICD-10-CM

## 2024-06-06 PROCEDURE — 99214 OFFICE O/P EST MOD 30 MIN: CPT

## 2024-06-06 NOTE — HISTORY OF PRESENT ILLNESS
[FreeTextEntry1] : 64 year old male with MARILUZ on PAP and delayed sleep phase type came in for f/u Compliance report reviewed: Usage 100% Average use 6 hrs 20 min Leak 5.9 AHI 3.4  He sleeps well with PAP. He works from 11 am to 7 pm He feels sleepy around 3 am WT 9 am Feels tired at times but still functions well in the daytime. He says he was always a short sleeper Did not try the melatonin to advance sleep schedule

## 2024-06-24 ENCOUNTER — RESULT REVIEW (OUTPATIENT)
Age: 64
End: 2024-06-24

## 2024-06-24 ENCOUNTER — APPOINTMENT (OUTPATIENT)
Dept: HEMATOLOGY ONCOLOGY | Facility: CLINIC | Age: 64
End: 2024-06-24
Payer: COMMERCIAL

## 2024-06-24 VITALS
TEMPERATURE: 97.4 F | WEIGHT: 194.06 LBS | DIASTOLIC BLOOD PRESSURE: 86 MMHG | OXYGEN SATURATION: 98 % | RESPIRATION RATE: 18 BRPM | BODY MASS INDEX: 29.41 KG/M2 | HEIGHT: 68 IN | HEART RATE: 63 BPM | SYSTOLIC BLOOD PRESSURE: 152 MMHG

## 2024-06-24 VITALS — DIASTOLIC BLOOD PRESSURE: 70 MMHG | SYSTOLIC BLOOD PRESSURE: 134 MMHG

## 2024-06-24 DIAGNOSIS — F31.70 BIPOLAR DISORDER, CURRENTLY IN REMISSION, MOST RECENT EPISODE UNSPECIFIED: ICD-10-CM

## 2024-06-24 DIAGNOSIS — C20 MALIGNANT NEOPLASM OF RECTUM: ICD-10-CM

## 2024-06-24 DIAGNOSIS — I26.99 OTHER PULMONARY EMBOLISM W/OUT ACUTE COR PULMONALE: ICD-10-CM

## 2024-06-24 PROCEDURE — 99214 OFFICE O/P EST MOD 30 MIN: CPT

## 2024-06-24 PROCEDURE — 36415 COLL VENOUS BLD VENIPUNCTURE: CPT

## 2024-06-24 NOTE — REVIEW OF SYSTEMS
[Recent Change In Weight] : ~T recent weight change [Constipation] : constipation [FreeTextEntry2] : until 6 lbs weight gain [Negative] : Constitutional

## 2024-06-24 NOTE — PHYSICAL EXAM
[Fully active, able to carry on all pre-disease performance without restriction] : Status 0 - Fully active, able to carry on all pre-disease performance without restriction [Normal] : affect appropriate [de-identified] : surgical sites well healed without evidence of hernia

## 2024-06-24 NOTE — ASSESSMENT
[FreeTextEntry1] : Mr. Nieto is a 63 year old male with history of PE s/p AC and stage 1 rectal adenocarcinoma that presents for routine follow up.  #Rectal Adenocarcinoma - Found on CNY - Stage 1 - Followed up with Dr. Dennis - 8/29/20 CT chest significant improvement in Ground glass interstitial peripheral infiltrates with minimal residual hazy granularity in the EMMANUEL and to lesser extent RUL. Findings suggest chronic vs interval residual or prior pneumonitis - CT A/P - no suspicious mass or adenopathy within the A/P. - CEA - 1.6 - Reviewed path - rectal polyp invasive adenocarcinoma arising in a tubulovillous adenoma, up to 1.5 cm in largest fragment. Moderately differentiated, invades into the submucosa, at the cauterized base of polyp.LVI not identified - 10/9/20 s/p flex sig by Dr. Dennis - Discussed at [facility] - mass at R heart border picked up by radiology on review of scans due to h/o PE. - 8/2019 CTA Heart - There is an extracardiac mass adjacent to the lateral aspect of the RA and to the lateral and anterior aspect of the SVC. There is no contrast uptake with in the mass. 5.2 x 1.7 x 6.1 cm cystic mass is noted contiguous with the R heart border. - 10/15/20 MRI A/P high rectal tumor, T2, No visible mesorectal/superior rectal LN or Extramesorectal LN. - PET/CT small, intensely FDG avid lesion in lesion in the sigmoid colon, likely representing that primary tumor. No evidence of metastatic disease. - s/p laparoscopic robotic LAR with diverticulectomy and loop ileostomy creation pT2, N0 stage I - s/p ileostomy closure -10/6/2021reviewed NCCN surveillance guidelines CNY 9/2021 tubular adenoma. Will receive another CNY in 1 year. No need for scans. CEA pending. - 1/11/2022- vs and labs reviewed. CEA 0.6 wnl. needs CNY in 9/2022. hgb 12.3, plts 138 - 5/24/22 - vs and labs reviewed. pending CEA - needs cny in Sept. - 9/2/2022 vs and CBC reviewed; repeat CNY with Dr. Dennis pending 10/7/2022; CEA last 1.1 - 12/9/22 - vs reviewed. labs drawn in office today. hgb 12, plts 125.wbc wnl. kidney function, liver function, electrolytes wnl. CEA pending. Had CNY 10/7/22 with Dr. Dennis normal mucosa in whole colon. continue current care, CNY 3 years 10/ 2025. CEA 1.0 (9/22) - 6/9/23 vs and CBC reviewed; WBC 5.15, hgb 13.6, plt 133. s/p CNY 10/22, due in 3 years. Reviewed. Continue follow up with Dr. Dennis, appt scheduled 6/28/23. -12/11/23 - vs reviewed. labs drawn in office today. wbc wnl, hgb 13.1 - monitor, plts 120 - monitor. kidney function stable, liver function wnl, electrolytes normal. follows with toshia Fish strombom, Feld and Vahid - all notes since last visit reviewed 6/24/24 - vs and labs reviewed. labs drawn in office today. hgb 12.1, plts 105 - discussed that he has been in this range before but could consider bm bx. He would like to wait on this. CEA from 12/23 was normal. pending repeat. due for CNY next year.  follows with toshia Fish strombom, Feld and Vahid - all notes since last visit reviewed   #Pericardial Mass - Persistent - 8/2019 CTA Heart - There is an extracardiac mass adjacent to the lateral aspect of the RA and to the lateral and anterior aspect of the SVC. There is no contrast uptake with in the mass. 5.2 x 1.7 x 6.1 cm cystic mass is noted contiguous with the R heart border. - PET/CT non avid right atrial appendag  #Bilateral PE no longer on AC.  was due to covid  #Anemia - hgb 12.1  iron - pending  #Thrombocytopenia - 105k Stable. No s/s of bleeding. Continue to monitor.  #Constipation - Controlled with prunes and laxatives PRN. BM every other day, normal in size, color, no BRBPR  #Health Maintenance - CNY: 10/2022 follow up 3 year - 2025 - PCP: Dr. Redding - reviewed note from 3/2024 - Nephro: Dr. Berry -reviewed note 4/24 - Pulm: Dr. Sandy - reviewed note 6/624  RTC in 6 mos with CBC with diff, CMP, CEA, irons, b12/folate, vit D, ESR/CRP.

## 2024-06-24 NOTE — HISTORY OF PRESENT ILLNESS
[de-identified] : 63 year old male presents for hospital follow up of pulmonary embolus.  Patient was admitted to [facility] 6/3/2020 and discharged 6/17/2020 with a diagnosis of COVID 19.  During admission patient found to have PE, discharged on Eliquis. Now off AC. \par  \par  Also with history of rectal adenocarcinoma found on CNY - stage 1. Followed up with Dr. Dennis\par  \par  8/29/20 - CT chest - significant improvement in Ground glass interstitial peripheral infiltrates with minimal residual hazy granularity in the EMMANUEL and to lesser extent RUL. Findings suggest chronic vs interval residual or prior pneumonitis\par  \par  CT A/P - no suspicious mass or adenopathy within the A/P. \par  \par  CEA - 1.6\par  \par  Path - rectal polyp invasive adenocarcinoma arising in a tubulovillous adenoma, up to 1.5 cm in largest fragment. Moderately differentiated, invades into the submucosa, at the cauterized base of polyp.LVI not identified\par  \par  CTA Heart - There is an extracardiac mass adjacent to the lateral aspect of the RA and to the lateral and anterior aspect of the SVC. There is no contrast uptake with in the mass. 5.2 x 1.7 x 6.1 cm cystic mass is noted contiguous with the R heart border.\par  \par  MRI A/P - 10/15/20 - high rectal tumor, T2, No visible mesorectal/superior rectal LN or Extramesorectal LN.\par  \par  PET CT - small, intensely FDG avid lesion in lesion in the sigmoid colon, likely representing that primary tumor. No evidence of metastatic disease.\par  \par  s/p laparoscopic robotic LAR with diverticulectomy and loop ileostomy creation  pT2, N0 - stage I\par  \par  s/p ileostomy closure\par   [de-identified] : Patient seen and examined and here today for follow up for the management of rectal cancer and hx of PE.  States feeling overall well.  He notes intermittent constipation, bowel movements every other day. He uses prunes/laxatives. He notes consistency of stools.  Health Maintenance:  CNY: 10/2022 follow up 3 years - 10/25/23 PCP: Dr. Redding appt scheduled 9/11/23  Nephro: Dr. Berry, following hyponatremia on salt tabs appt scheduled 6/22/23  Pulm: Dr. Sandy appt [id number]

## 2024-07-02 ENCOUNTER — APPOINTMENT (OUTPATIENT)
Dept: SURGERY | Facility: CLINIC | Age: 64
End: 2024-07-02

## 2024-07-11 ENCOUNTER — RX RENEWAL (OUTPATIENT)
Age: 64
End: 2024-07-11

## 2024-08-21 ENCOUNTER — APPOINTMENT (OUTPATIENT)
Dept: SURGERY | Facility: CLINIC | Age: 64
End: 2024-08-21

## 2024-08-21 VITALS
DIASTOLIC BLOOD PRESSURE: 85 MMHG | HEIGHT: 68 IN | HEART RATE: 72 BPM | OXYGEN SATURATION: 99 % | SYSTOLIC BLOOD PRESSURE: 130 MMHG | BODY MASS INDEX: 29.22 KG/M2 | WEIGHT: 192.8 LBS

## 2024-08-21 PROCEDURE — 99213 OFFICE O/P EST LOW 20 MIN: CPT

## 2024-08-22 ENCOUNTER — NON-APPOINTMENT (OUTPATIENT)
Age: 64
End: 2024-08-22

## 2024-08-23 ENCOUNTER — APPOINTMENT (OUTPATIENT)
Dept: NEPHROLOGY | Facility: CLINIC | Age: 64
End: 2024-08-23
Payer: COMMERCIAL

## 2024-08-23 VITALS
SYSTOLIC BLOOD PRESSURE: 168 MMHG | OXYGEN SATURATION: 94 % | BODY MASS INDEX: 29.4 KG/M2 | WEIGHT: 194 LBS | HEIGHT: 68 IN | DIASTOLIC BLOOD PRESSURE: 86 MMHG | HEART RATE: 75 BPM

## 2024-08-23 VITALS — DIASTOLIC BLOOD PRESSURE: 90 MMHG | SYSTOLIC BLOOD PRESSURE: 146 MMHG

## 2024-08-23 DIAGNOSIS — I10 ESSENTIAL (PRIMARY) HYPERTENSION: ICD-10-CM

## 2024-08-23 DIAGNOSIS — G47.33 OBSTRUCTIVE SLEEP APNEA (ADULT) (PEDIATRIC): ICD-10-CM

## 2024-08-23 DIAGNOSIS — N18.2 CHRONIC KIDNEY DISEASE, STAGE 2 (MILD): ICD-10-CM

## 2024-08-23 DIAGNOSIS — E79.0 HYPERURICEMIA W/OUT SIGNS OF INFLAMMATORY ARTHRITIS AND TOPHACEOUS DISEASE: ICD-10-CM

## 2024-08-23 DIAGNOSIS — E87.1 HYPO-OSMOLALITY AND HYPONATREMIA: ICD-10-CM

## 2024-08-23 PROCEDURE — G2211 COMPLEX E/M VISIT ADD ON: CPT

## 2024-08-23 PROCEDURE — 99214 OFFICE O/P EST MOD 30 MIN: CPT

## 2024-08-23 NOTE — PHYSICAL EXAM
[General Appearance - Alert] : alert [General Appearance - In No Acute Distress] : in no acute distress [Sclera] : the sclera and conjunctiva were normal [Extraocular Movements] : extraocular movements were intact [Outer Ear] : the ears and nose were normal in appearance [Hearing Threshold Finger Rub Not [place]] : hearing was normal [] : no respiratory distress [Exaggerated Use Of Accessory Muscles For Inspiration] : no accessory muscle use [Apical Impulse] : the apical impulse was normal [Heart Rate And Rhythm] : heart rate was normal and rhythm regular [Bowel Sounds] : normal bowel sounds [Abdomen Soft] : soft [Abnormal Walk] : normal gait [Musculoskeletal - Swelling] : no joint swelling seen [Skin Color & Pigmentation] : normal skin color and pigmentation [Skin Turgor] : normal skin turgor [Cranial Nerves] : cranial nerves 2-12 were intact [No Focal Deficits] : no focal deficits [Oriented To Time, Place, And Person] : oriented to person, place, and time [Impaired Insight] : insight and judgment were intact [Affect] : the affect was normal [Mood] : the mood was normal

## 2024-08-23 NOTE — HISTORY OF PRESENT ILLNESS
[FreeTextEntry1] : 65 yo male from [facility] - here for f/u of TASHA on CKD, with adjustments in BP meds and initiation of allopurinol creatinine has improved to 1.1/1.2 from high of 1.5; Diagnosed with colon cancer 2020 August, no chemo, stage 1, colon resection, ileostomy, reversed 2/2021, was constipated since, cr improved, weight improved ( lost weight) Continues to struggle with insomnia, though has MARILUZ and is using CPAP   from initial  visit;  [facility] male  with hx of bipolar d/o never on Lithium, HTN ( on 5 meds), hx of syncope ( was seen by cardiology) - creatinine fluctuating from 1.29 to 1.68 over past year - labs also indicate both low and high SNa, raising possibility that his fluid intake varies, furthermore, fasts for his blood tests and may fast for as long as 14 hours   SHx: denies NSAID No smoking no alcohol FHx: no FHx of CKD  8/2024 - here for f/u ckd,HTN, MARILUZ - BP appears to be at goal when seeing other doctors, is extremely anxious in this office, specifically because of fear of having lab work - didnt sleep at all night before - is so worried about what results may show...becomes extremely anxious

## 2024-08-23 NOTE — ASSESSMENT
[FreeTextEntry1] : White Coat Syndrome "resistant" HTN since age 18, SBP improved, DBP elevated, no hyepraldo, found to have MARILUZ - recalls being extremely anxious as a child - fearful of MDs and would have high bp at MD office, but never at home, seems to be the case now, no e/o end organ damage ( ie LVH or CKD) - also affected by insomnia ( sress)  MARILUZ - using CPAP, but cont to have diff sleeping  - counseled on low salt diet as this may explain elevated DBP - cont hydralazine to 50qam/50qpm/50mg qpm   CKD   - cr 1.1 , ckd 3 resolved  Hyponatremia  - has been drinking 10+ cups of water/day due to dry mouth from se of psych meds ,  advised he reduce it to no more than 40oz/day,  - cont salt tabs    Hyperuricemia - cont allopurinol, recheck urate level    Constipation - adequate fluid intake, was not present prior to colonic resection and re-asnastamosis - suspect constriction, had colonoscopy,cont otc ex-lax    MARILUZ - admonished to use CPAP - BP improved    Heme note,GI ote, Gsurg note, pulm note reviewed

## 2024-09-23 ENCOUNTER — RX RENEWAL (OUTPATIENT)
Age: 64
End: 2024-09-23

## 2024-10-24 ENCOUNTER — RX RENEWAL (OUTPATIENT)
Age: 64
End: 2024-10-24

## 2024-11-04 ENCOUNTER — APPOINTMENT (OUTPATIENT)
Dept: FAMILY MEDICINE | Facility: CLINIC | Age: 64
End: 2024-11-04
Payer: COMMERCIAL

## 2024-11-04 VITALS
WEIGHT: 196 LBS | HEART RATE: 72 BPM | OXYGEN SATURATION: 96 % | DIASTOLIC BLOOD PRESSURE: 80 MMHG | HEIGHT: 68 IN | BODY MASS INDEX: 29.7 KG/M2 | SYSTOLIC BLOOD PRESSURE: 140 MMHG

## 2024-11-04 DIAGNOSIS — I10 ESSENTIAL (PRIMARY) HYPERTENSION: ICD-10-CM

## 2024-11-04 DIAGNOSIS — N18.2 CHRONIC KIDNEY DISEASE, STAGE 2 (MILD): ICD-10-CM

## 2024-11-04 DIAGNOSIS — R73.03 PREDIABETES.: ICD-10-CM

## 2024-11-04 DIAGNOSIS — F31.70 BIPOLAR DISORDER, CURRENTLY IN REMISSION, MOST RECENT EPISODE UNSPECIFIED: ICD-10-CM

## 2024-11-04 DIAGNOSIS — M25.512 PAIN IN LEFT SHOULDER: ICD-10-CM

## 2024-11-04 DIAGNOSIS — E78.2 MIXED HYPERLIPIDEMIA: ICD-10-CM

## 2024-11-04 DIAGNOSIS — D64.9 ANEMIA, UNSPECIFIED: ICD-10-CM

## 2024-11-04 DIAGNOSIS — E79.0 HYPERURICEMIA W/OUT SIGNS OF INFLAMMATORY ARTHRITIS AND TOPHACEOUS DISEASE: ICD-10-CM

## 2024-11-04 DIAGNOSIS — E87.1 HYPO-OSMOLALITY AND HYPONATREMIA: ICD-10-CM

## 2024-11-04 DIAGNOSIS — D69.6 THROMBOCYTOPENIA, UNSPECIFIED: ICD-10-CM

## 2024-11-04 DIAGNOSIS — C20 MALIGNANT NEOPLASM OF RECTUM: ICD-10-CM

## 2024-11-04 DIAGNOSIS — G89.29 PAIN IN LEFT SHOULDER: ICD-10-CM

## 2024-11-04 DIAGNOSIS — D72.819 DECREASED WHITE BLOOD CELL COUNT, UNSPECIFIED: ICD-10-CM

## 2024-11-04 PROCEDURE — G2211 COMPLEX E/M VISIT ADD ON: CPT

## 2024-11-04 PROCEDURE — 36415 COLL VENOUS BLD VENIPUNCTURE: CPT

## 2024-11-04 PROCEDURE — 99214 OFFICE O/P EST MOD 30 MIN: CPT

## 2024-11-04 PROCEDURE — G0444 DEPRESSION SCREEN ANNUAL: CPT | Mod: 59

## 2024-11-05 PROBLEM — M25.512 CHRONIC LEFT SHOULDER PAIN: Status: ACTIVE | Noted: 2024-11-05

## 2024-11-06 LAB
ALBUMIN SERPL ELPH-MCNC: 4.5 G/DL
ALP BLD-CCNC: 44 U/L
ALT SERPL-CCNC: 16 U/L
ANION GAP SERPL CALC-SCNC: 15 MMOL/L
AST SERPL-CCNC: 28 U/L
BASOPHILS # BLD AUTO: 0.02 K/UL
BASOPHILS NFR BLD AUTO: 0.3 %
BILIRUB SERPL-MCNC: 0.4 MG/DL
BUN SERPL-MCNC: 13 MG/DL
CALCIUM SERPL-MCNC: 9.3 MG/DL
CHLORIDE SERPL-SCNC: 106 MMOL/L
CHOLEST SERPL-MCNC: 130 MG/DL
CO2 SERPL-SCNC: 23 MMOL/L
CREAT SERPL-MCNC: 1.1 MG/DL
EGFR: 76 ML/MIN/1.73M2
EOSINOPHIL # BLD AUTO: 0.02 K/UL
EOSINOPHIL NFR BLD AUTO: 0.3 %
ESTIMATED AVERAGE GLUCOSE: 120 MG/DL
GLUCOSE SERPL-MCNC: 98 MG/DL
HBA1C MFR BLD HPLC: 5.8 %
HCT VFR BLD CALC: 38.9 %
HDLC SERPL-MCNC: 57 MG/DL
HGB BLD-MCNC: 12.4 G/DL
IMM GRANULOCYTES NFR BLD AUTO: 0.9 %
LDLC SERPL CALC-MCNC: 61 MG/DL
LYMPHOCYTES # BLD AUTO: 1.17 K/UL
LYMPHOCYTES NFR BLD AUTO: 17.8 %
MAN DIFF?: NORMAL
MCHC RBC-ENTMCNC: 30.8 PG
MCHC RBC-ENTMCNC: 31.9 G/DL
MCV RBC AUTO: 96.8 FL
MONOCYTES # BLD AUTO: 0.55 K/UL
MONOCYTES NFR BLD AUTO: 8.3 %
NEUTROPHILS # BLD AUTO: 4.77 K/UL
NEUTROPHILS NFR BLD AUTO: 72.4 %
NONHDLC SERPL-MCNC: 74 MG/DL
PLATELET # BLD AUTO: 111 K/UL
POTASSIUM SERPL-SCNC: 4.5 MMOL/L
PROT SERPL-MCNC: 7 G/DL
RBC # BLD: 4.02 M/UL
RBC # FLD: 14.3 %
SODIUM SERPL-SCNC: 144 MMOL/L
TRIGL SERPL-MCNC: 59 MG/DL
URATE SERPL-MCNC: 5.2 MG/DL
VALPROATE SERPL-MCNC: 68 UG/ML
WBC # FLD AUTO: 6.59 K/UL

## 2024-11-18 ENCOUNTER — APPOINTMENT (OUTPATIENT)
Dept: CARDIOLOGY | Facility: CLINIC | Age: 64
End: 2024-11-18
Payer: COMMERCIAL

## 2024-11-18 ENCOUNTER — NON-APPOINTMENT (OUTPATIENT)
Age: 64
End: 2024-11-18

## 2024-11-18 VITALS
WEIGHT: 192 LBS | DIASTOLIC BLOOD PRESSURE: 93 MMHG | SYSTOLIC BLOOD PRESSURE: 160 MMHG | HEART RATE: 76 BPM | OXYGEN SATURATION: 99 % | BODY MASS INDEX: 29.19 KG/M2

## 2024-11-18 DIAGNOSIS — Z86.69 PERSONAL HISTORY OF OTHER DISEASES OF THE NERVOUS SYSTEM AND SENSE ORGANS: ICD-10-CM

## 2024-11-18 DIAGNOSIS — Z86.79 PERSONAL HISTORY OF OTHER DISEASES OF THE CIRCULATORY SYSTEM: ICD-10-CM

## 2024-11-18 DIAGNOSIS — Z86.59 PERSONAL HISTORY OF OTHER MENTAL AND BEHAVIORAL DISORDERS: ICD-10-CM

## 2024-11-18 DIAGNOSIS — E53.8 DEFICIENCY OF OTHER SPECIFIED B GROUP VITAMINS: ICD-10-CM

## 2024-11-18 DIAGNOSIS — E78.2 MIXED HYPERLIPIDEMIA: ICD-10-CM

## 2024-11-18 DIAGNOSIS — Z85.048 PERSONAL HISTORY OF OTHER MALIGNANT NEOPLASM OF RECTUM, RECTOSIGMOID JUNCTION, AND ANUS: ICD-10-CM

## 2024-11-18 DIAGNOSIS — E66.3 OVERWEIGHT: ICD-10-CM

## 2024-11-18 DIAGNOSIS — I38 ENDOCARDITIS, VALVE UNSPECIFIED: ICD-10-CM

## 2024-11-18 DIAGNOSIS — I10 ESSENTIAL (PRIMARY) HYPERTENSION: ICD-10-CM

## 2024-11-18 PROCEDURE — 93000 ELECTROCARDIOGRAM COMPLETE: CPT

## 2024-11-18 PROCEDURE — 99214 OFFICE O/P EST MOD 30 MIN: CPT

## 2024-11-19 PROBLEM — Z86.79 HISTORY OF HEART VALVE ABNORMALITY: Status: RESOLVED | Noted: 2023-11-26 | Resolved: 2024-11-19

## 2024-11-19 PROBLEM — I38 VALVULAR HEART DISEASE: Status: ACTIVE | Noted: 2024-11-19

## 2024-11-19 PROBLEM — Z86.79 HISTORY OF HYPERTENSION: Status: RESOLVED | Noted: 2024-11-19 | Resolved: 2024-11-19

## 2024-11-19 PROBLEM — Z86.59 HISTORY OF BIPOLAR DISORDER: Status: RESOLVED | Noted: 2024-11-19 | Resolved: 2024-11-19

## 2024-11-19 PROBLEM — Z85.048 HISTORY OF RECTAL CANCER: Status: RESOLVED | Noted: 2024-11-19 | Resolved: 2024-11-19

## 2024-11-19 PROBLEM — E53.8 VITAMIN B 12 DEFICIENCY: Status: RESOLVED | Noted: 2024-11-19 | Resolved: 2024-11-19

## 2024-11-19 PROBLEM — Z86.69 HISTORY OF SLEEP APNEA: Status: RESOLVED | Noted: 2019-11-08 | Resolved: 2024-11-19

## 2024-12-23 ENCOUNTER — RESULT REVIEW (OUTPATIENT)
Age: 64
End: 2024-12-23

## 2024-12-23 ENCOUNTER — NON-APPOINTMENT (OUTPATIENT)
Age: 64
End: 2024-12-23

## 2024-12-23 ENCOUNTER — APPOINTMENT (OUTPATIENT)
Dept: HEMATOLOGY ONCOLOGY | Facility: CLINIC | Age: 64
End: 2024-12-23
Payer: COMMERCIAL

## 2024-12-23 VITALS
SYSTOLIC BLOOD PRESSURE: 125 MMHG | RESPIRATION RATE: 16 BRPM | WEIGHT: 198.06 LBS | HEART RATE: 64 BPM | HEIGHT: 68 IN | TEMPERATURE: 97.7 F | OXYGEN SATURATION: 100 % | DIASTOLIC BLOOD PRESSURE: 79 MMHG | BODY MASS INDEX: 30.02 KG/M2

## 2024-12-23 DIAGNOSIS — I26.99 OTHER PULMONARY EMBOLISM W/OUT ACUTE COR PULMONALE: ICD-10-CM

## 2024-12-23 DIAGNOSIS — C20 MALIGNANT NEOPLASM OF RECTUM: ICD-10-CM

## 2024-12-23 PROCEDURE — 36415 COLL VENOUS BLD VENIPUNCTURE: CPT

## 2024-12-23 PROCEDURE — 99204 OFFICE O/P NEW MOD 45 MIN: CPT | Mod: 25

## 2024-12-27 ENCOUNTER — APPOINTMENT (OUTPATIENT)
Dept: PULMONOLOGY | Facility: CLINIC | Age: 64
End: 2024-12-27
Payer: COMMERCIAL

## 2024-12-27 VITALS
HEIGHT: 68 IN | WEIGHT: 198 LBS | HEART RATE: 67 BPM | DIASTOLIC BLOOD PRESSURE: 78 MMHG | SYSTOLIC BLOOD PRESSURE: 148 MMHG | BODY MASS INDEX: 30.01 KG/M2 | TEMPERATURE: 96 F | OXYGEN SATURATION: 98 %

## 2024-12-27 DIAGNOSIS — G47.33 OBSTRUCTIVE SLEEP APNEA (ADULT) (PEDIATRIC): ICD-10-CM

## 2024-12-27 PROCEDURE — 99213 OFFICE O/P EST LOW 20 MIN: CPT

## 2025-01-15 ENCOUNTER — APPOINTMENT (OUTPATIENT)
Dept: NEPHROLOGY | Facility: CLINIC | Age: 65
End: 2025-01-15
Payer: COMMERCIAL

## 2025-01-15 VITALS
SYSTOLIC BLOOD PRESSURE: 150 MMHG | BODY MASS INDEX: 29.86 KG/M2 | HEART RATE: 71 BPM | HEIGHT: 68 IN | OXYGEN SATURATION: 97 % | DIASTOLIC BLOOD PRESSURE: 80 MMHG | WEIGHT: 197 LBS

## 2025-01-15 DIAGNOSIS — G47.33 OBSTRUCTIVE SLEEP APNEA (ADULT) (PEDIATRIC): ICD-10-CM

## 2025-01-15 DIAGNOSIS — E87.1 HYPO-OSMOLALITY AND HYPONATREMIA: ICD-10-CM

## 2025-01-15 DIAGNOSIS — N18.2 CHRONIC KIDNEY DISEASE, STAGE 2 (MILD): ICD-10-CM

## 2025-01-15 DIAGNOSIS — I10 ESSENTIAL (PRIMARY) HYPERTENSION: ICD-10-CM

## 2025-01-15 PROCEDURE — 99214 OFFICE O/P EST MOD 30 MIN: CPT

## 2025-01-15 PROCEDURE — G2211 COMPLEX E/M VISIT ADD ON: CPT

## 2025-01-22 PROBLEM — N18.2 CKD (CHRONIC KIDNEY DISEASE), STAGE II: Status: RESOLVED | Noted: 2024-08-23 | Resolved: 2025-01-22

## 2025-02-03 ENCOUNTER — APPOINTMENT (OUTPATIENT)
Dept: NEPHROLOGY | Facility: CLINIC | Age: 65
End: 2025-02-03
Payer: COMMERCIAL

## 2025-02-03 DIAGNOSIS — E87.1 HYPO-OSMOLALITY AND HYPONATREMIA: ICD-10-CM

## 2025-02-03 PROCEDURE — 36415 COLL VENOUS BLD VENIPUNCTURE: CPT

## 2025-02-04 LAB
ANION GAP SERPL CALC-SCNC: 13 MMOL/L
BUN SERPL-MCNC: 10 MG/DL
CALCIUM SERPL-MCNC: 9.3 MG/DL
CHLORIDE SERPL-SCNC: 97 MMOL/L
CO2 SERPL-SCNC: 22 MMOL/L
CREAT SERPL-MCNC: 1.09 MG/DL
EGFR: 76 ML/MIN/1.73M2
GLUCOSE SERPL-MCNC: 119 MG/DL
POTASSIUM SERPL-SCNC: 4.5 MMOL/L
SODIUM SERPL-SCNC: 132 MMOL/L

## 2025-02-06 ENCOUNTER — APPOINTMENT (OUTPATIENT)
Dept: UROLOGY | Facility: CLINIC | Age: 65
End: 2025-02-06
Payer: COMMERCIAL

## 2025-02-06 VITALS — WEIGHT: 195 LBS | BODY MASS INDEX: 29.55 KG/M2 | HEIGHT: 68 IN

## 2025-02-06 DIAGNOSIS — N40.1 BENIGN PROSTATIC HYPERPLASIA WITH LOWER URINARY TRACT SYMPMS: ICD-10-CM

## 2025-02-06 PROCEDURE — 99204 OFFICE O/P NEW MOD 45 MIN: CPT

## 2025-02-06 PROCEDURE — 76870 US EXAM SCROTUM: CPT

## 2025-02-06 RX ORDER — TADALAFIL 5 MG/1
5 TABLET ORAL DAILY
Qty: 30 | Refills: 6 | Status: ACTIVE | COMMUNITY
Start: 2025-02-06 | End: 1900-01-01

## 2025-02-28 ENCOUNTER — RX RENEWAL (OUTPATIENT)
Age: 65
End: 2025-02-28

## 2025-03-25 ENCOUNTER — RX RENEWAL (OUTPATIENT)
Age: 65
End: 2025-03-25

## 2025-04-17 ENCOUNTER — RX RENEWAL (OUTPATIENT)
Age: 65
End: 2025-04-17

## 2025-04-17 DIAGNOSIS — E87.1 HYPO-OSMOLALITY AND HYPONATREMIA: ICD-10-CM

## 2025-04-17 RX ORDER — NORMAL SALT TABLETS 1 G/G
1 TABLET ORAL TWICE DAILY
Qty: 180 | Refills: 3 | Status: ACTIVE | COMMUNITY
Start: 2025-04-17 | End: 1900-01-01

## 2025-05-21 ENCOUNTER — RX RENEWAL (OUTPATIENT)
Age: 65
End: 2025-05-21

## 2025-05-28 ENCOUNTER — RESULT REVIEW (OUTPATIENT)
Age: 65
End: 2025-05-28

## 2025-05-28 ENCOUNTER — APPOINTMENT (OUTPATIENT)
Dept: NEPHROLOGY | Facility: CLINIC | Age: 65
End: 2025-05-28
Payer: COMMERCIAL

## 2025-05-28 VITALS
BODY MASS INDEX: 28.49 KG/M2 | WEIGHT: 188 LBS | DIASTOLIC BLOOD PRESSURE: 60 MMHG | HEIGHT: 68 IN | SYSTOLIC BLOOD PRESSURE: 130 MMHG | OXYGEN SATURATION: 98 % | HEART RATE: 68 BPM

## 2025-05-28 DIAGNOSIS — E79.0 HYPERURICEMIA W/OUT SIGNS OF INFLAMMATORY ARTHRITIS AND TOPHACEOUS DISEASE: ICD-10-CM

## 2025-05-28 DIAGNOSIS — E87.1 HYPO-OSMOLALITY AND HYPONATREMIA: ICD-10-CM

## 2025-05-28 DIAGNOSIS — I10 ESSENTIAL (PRIMARY) HYPERTENSION: ICD-10-CM

## 2025-05-28 PROCEDURE — G2211 COMPLEX E/M VISIT ADD ON: CPT

## 2025-05-28 PROCEDURE — 99214 OFFICE O/P EST MOD 30 MIN: CPT

## 2025-05-30 LAB — SODIUM ?TM SUB UR QN: 52 MMOL/L

## 2025-06-30 ENCOUNTER — RESULT REVIEW (OUTPATIENT)
Age: 65
End: 2025-06-30

## 2025-06-30 ENCOUNTER — APPOINTMENT (OUTPATIENT)
Dept: HEMATOLOGY ONCOLOGY | Facility: CLINIC | Age: 65
End: 2025-06-30
Payer: COMMERCIAL

## 2025-06-30 VITALS — DIASTOLIC BLOOD PRESSURE: 80 MMHG | SYSTOLIC BLOOD PRESSURE: 134 MMHG

## 2025-06-30 VITALS
WEIGHT: 184 LBS | TEMPERATURE: 97 F | SYSTOLIC BLOOD PRESSURE: 142 MMHG | OXYGEN SATURATION: 98 % | HEIGHT: 68 IN | RESPIRATION RATE: 16 BRPM | HEART RATE: 74 BPM | DIASTOLIC BLOOD PRESSURE: 74 MMHG | BODY MASS INDEX: 27.89 KG/M2

## 2025-06-30 VITALS — SYSTOLIC BLOOD PRESSURE: 134 MMHG | DIASTOLIC BLOOD PRESSURE: 84 MMHG

## 2025-06-30 PROCEDURE — 36415 COLL VENOUS BLD VENIPUNCTURE: CPT

## 2025-06-30 PROCEDURE — 99214 OFFICE O/P EST MOD 30 MIN: CPT

## 2025-06-30 PROCEDURE — G2211 COMPLEX E/M VISIT ADD ON: CPT

## 2025-07-22 ENCOUNTER — APPOINTMENT (OUTPATIENT)
Dept: SURGERY | Facility: CLINIC | Age: 65
End: 2025-07-22
Payer: COMMERCIAL

## 2025-07-22 VITALS
BODY MASS INDEX: 29.55 KG/M2 | DIASTOLIC BLOOD PRESSURE: 76 MMHG | WEIGHT: 195 LBS | HEIGHT: 68 IN | HEART RATE: 66 BPM | SYSTOLIC BLOOD PRESSURE: 124 MMHG | OXYGEN SATURATION: 100 %

## 2025-07-22 DIAGNOSIS — K43.2 INCISIONAL HERNIA W/OUT OBSTRUCTION OR GANGRENE: ICD-10-CM

## 2025-07-22 PROCEDURE — 99213 OFFICE O/P EST LOW 20 MIN: CPT

## 2025-07-23 ENCOUNTER — RX RENEWAL (OUTPATIENT)
Age: 65
End: 2025-07-23

## 2025-07-28 ENCOUNTER — RX RENEWAL (OUTPATIENT)
Age: 65
End: 2025-07-28

## 2025-08-06 ENCOUNTER — APPOINTMENT (OUTPATIENT)
Dept: GASTROENTEROLOGY | Facility: CLINIC | Age: 65
End: 2025-08-06
Payer: COMMERCIAL

## 2025-08-06 VITALS
WEIGHT: 190 LBS | HEIGHT: 68 IN | SYSTOLIC BLOOD PRESSURE: 120 MMHG | BODY MASS INDEX: 28.79 KG/M2 | DIASTOLIC BLOOD PRESSURE: 80 MMHG

## 2025-08-06 DIAGNOSIS — C20 MALIGNANT NEOPLASM OF RECTUM: ICD-10-CM

## 2025-08-06 PROCEDURE — 99204 OFFICE O/P NEW MOD 45 MIN: CPT

## 2025-08-06 PROCEDURE — G2211 COMPLEX E/M VISIT ADD ON: CPT

## 2025-08-14 ENCOUNTER — APPOINTMENT (OUTPATIENT)
Dept: UROLOGY | Facility: CLINIC | Age: 65
End: 2025-08-14
Payer: COMMERCIAL

## 2025-08-14 VITALS — WEIGHT: 191 LBS | BODY MASS INDEX: 28.95 KG/M2 | HEIGHT: 68 IN

## 2025-08-14 DIAGNOSIS — R35.0 FREQUENCY OF MICTURITION: ICD-10-CM

## 2025-08-14 DIAGNOSIS — Z12.5 ENCOUNTER FOR SCREENING FOR MALIGNANT NEOPLASM OF PROSTATE: ICD-10-CM

## 2025-08-14 PROCEDURE — 99213 OFFICE O/P EST LOW 20 MIN: CPT

## 2025-08-14 PROCEDURE — 51798 US URINE CAPACITY MEASURE: CPT

## 2025-08-15 LAB — PSA SERPL-MCNC: 0.64 NG/ML

## 2025-08-16 PROBLEM — Z12.5 PROSTATE CANCER SCREENING: Status: ACTIVE | Noted: 2025-08-16

## 2025-08-21 ENCOUNTER — RX RENEWAL (OUTPATIENT)
Age: 65
End: 2025-08-21

## 2025-08-25 ENCOUNTER — APPOINTMENT (OUTPATIENT)
Dept: FAMILY MEDICINE | Facility: CLINIC | Age: 65
End: 2025-08-25
Payer: COMMERCIAL

## 2025-08-25 VITALS
HEIGHT: 68 IN | WEIGHT: 188 LBS | BODY MASS INDEX: 28.49 KG/M2 | OXYGEN SATURATION: 98 % | HEART RATE: 68 BPM | TEMPERATURE: 98 F | SYSTOLIC BLOOD PRESSURE: 158 MMHG | DIASTOLIC BLOOD PRESSURE: 90 MMHG

## 2025-08-25 DIAGNOSIS — R73.03 PREDIABETES.: ICD-10-CM

## 2025-08-25 DIAGNOSIS — F31.70 BIPOLAR DISORDER, CURRENTLY IN REMISSION, MOST RECENT EPISODE UNSPECIFIED: ICD-10-CM

## 2025-08-25 DIAGNOSIS — D64.9 ANEMIA, UNSPECIFIED: ICD-10-CM

## 2025-08-25 DIAGNOSIS — I10 ESSENTIAL (PRIMARY) HYPERTENSION: ICD-10-CM

## 2025-08-25 PROCEDURE — 99214 OFFICE O/P EST MOD 30 MIN: CPT

## 2025-08-25 PROCEDURE — G2211 COMPLEX E/M VISIT ADD ON: CPT

## 2025-08-25 PROCEDURE — 36415 COLL VENOUS BLD VENIPUNCTURE: CPT

## 2025-08-29 DIAGNOSIS — E87.1 HYPO-OSMOLALITY AND HYPONATREMIA: ICD-10-CM

## 2025-08-29 LAB
ALBUMIN SERPL ELPH-MCNC: 4.6 G/DL
ALP BLD-CCNC: 47 U/L
ALT SERPL-CCNC: 16 U/L
ANION GAP SERPL CALC-SCNC: 16 MMOL/L
AST SERPL-CCNC: 34 U/L
BASOPHILS # BLD AUTO: 0.02 K/UL
BASOPHILS NFR BLD AUTO: 0.5 %
BILIRUB SERPL-MCNC: 0.6 MG/DL
BUN SERPL-MCNC: 13 MG/DL
CALCIUM SERPL-MCNC: 9.6 MG/DL
CHLORIDE SERPL-SCNC: 93 MMOL/L
CO2 SERPL-SCNC: 21 MMOL/L
CREAT SERPL-MCNC: 1.07 MG/DL
EGFRCR SERPLBLD CKD-EPI 2021: 78 ML/MIN/1.73M2
EOSINOPHIL # BLD AUTO: 0.02 K/UL
EOSINOPHIL NFR BLD AUTO: 0.5 %
ESTIMATED AVERAGE GLUCOSE: 123 MG/DL
GLUCOSE SERPL-MCNC: 88 MG/DL
HBA1C MFR BLD HPLC: 5.9 %
HCT VFR BLD CALC: 36.5 %
HGB BLD-MCNC: 12.5 G/DL
IMM GRANULOCYTES NFR BLD AUTO: 0.2 %
LYMPHOCYTES # BLD AUTO: 1.1 K/UL
LYMPHOCYTES NFR BLD AUTO: 25.7 %
MAN DIFF?: NORMAL
MCHC RBC-ENTMCNC: 31 PG
MCHC RBC-ENTMCNC: 34.2 G/DL
MCV RBC AUTO: 90.6 FL
MONOCYTES # BLD AUTO: 0.57 K/UL
MONOCYTES NFR BLD AUTO: 13.3 %
NEUTROPHILS # BLD AUTO: 2.56 K/UL
NEUTROPHILS NFR BLD AUTO: 59.8 %
PLATELET # BLD AUTO: 106 K/UL
POTASSIUM SERPL-SCNC: 4.5 MMOL/L
PROT SERPL-MCNC: 7.2 G/DL
RBC # BLD: 4.03 M/UL
RBC # FLD: 12.8 %
SODIUM SERPL-SCNC: 130 MMOL/L
VALPROATE SERPL-MCNC: 98 UG/ML
WBC # FLD AUTO: 4.28 K/UL

## 2025-09-18 ENCOUNTER — RX RENEWAL (OUTPATIENT)
Age: 65
End: 2025-09-18

## 2025-09-19 ENCOUNTER — RESULT REVIEW (OUTPATIENT)
Age: 65
End: 2025-09-19

## 2025-09-19 ENCOUNTER — APPOINTMENT (OUTPATIENT)
Dept: GASTROENTEROLOGY | Facility: HOSPITAL | Age: 65
End: 2025-09-19